# Patient Record
Sex: FEMALE | Race: WHITE | NOT HISPANIC OR LATINO
[De-identification: names, ages, dates, MRNs, and addresses within clinical notes are randomized per-mention and may not be internally consistent; named-entity substitution may affect disease eponyms.]

---

## 2020-04-26 ENCOUNTER — MESSAGE (OUTPATIENT)
Age: 45
End: 2020-04-26

## 2020-06-21 ENCOUNTER — TRANSCRIPTION ENCOUNTER (OUTPATIENT)
Age: 45
End: 2020-06-21

## 2020-09-20 ENCOUNTER — TRANSCRIPTION ENCOUNTER (OUTPATIENT)
Age: 45
End: 2020-09-20

## 2020-12-09 PROBLEM — Z00.00 ENCOUNTER FOR PREVENTIVE HEALTH EXAMINATION: Status: ACTIVE | Noted: 2020-12-09

## 2021-01-08 ENCOUNTER — LABORATORY RESULT (OUTPATIENT)
Age: 46
End: 2021-01-08

## 2021-01-08 ENCOUNTER — APPOINTMENT (OUTPATIENT)
Dept: RHEUMATOLOGY | Facility: CLINIC | Age: 46
End: 2021-01-08
Payer: COMMERCIAL

## 2021-01-08 VITALS
DIASTOLIC BLOOD PRESSURE: 87 MMHG | BODY MASS INDEX: 34.99 KG/M2 | OXYGEN SATURATION: 97 % | HEART RATE: 84 BPM | HEIGHT: 65 IN | WEIGHT: 210 LBS | SYSTOLIC BLOOD PRESSURE: 124 MMHG

## 2021-01-08 DIAGNOSIS — Z78.9 OTHER SPECIFIED HEALTH STATUS: ICD-10-CM

## 2021-01-08 DIAGNOSIS — F41.9 ANXIETY DISORDER, UNSPECIFIED: ICD-10-CM

## 2021-01-08 PROCEDURE — 99245 OFF/OP CONSLTJ NEW/EST HI 55: CPT

## 2021-01-08 PROCEDURE — 99072 ADDL SUPL MATRL&STAF TM PHE: CPT

## 2021-01-11 ENCOUNTER — LABORATORY RESULT (OUTPATIENT)
Age: 46
End: 2021-01-11

## 2021-01-12 PROBLEM — Z78.9 NON-SMOKER: Status: ACTIVE | Noted: 2021-01-12

## 2021-01-12 PROBLEM — F41.9 ANXIETY: Status: ACTIVE | Noted: 2021-01-12

## 2021-01-12 NOTE — HISTORY OF PRESENT ILLNESS
[FreeTextEntry1] : 46 yo PMHX psoriasis, graves, htn, anxiety here for evaluation of polyarthralgias\par \par Joint pain - slow onset.  intermittent and random\par - toes hurt.  worsened with movement \par - hands swelling - entire hand - cant get wedding band on - \par - no one finger - entire hand \par \par Associated symptoms and rheum ROS\par - Developed paraesthesias hand and was found to have bilateral carpal tunnel and cubital tunnel syndromes.  Now s/p surgery for the same.  These were successful with resolution of paraesthesias.  \par - legs cramps no weakness\par - back hurts - sore- though not particularly worse in the AM\par - Psoriasis - since high school.   started on otezla (effective but gi issues), cosentyx (ineffective), now on skyrizi ().  also using shampoos.   skin was so bad that hair was falling out in patches.   is supposed to start rogaine soon.  waiting for more response from the skyrizi first.  \par - eyes look red - no pain \par - preeclampsia with first baby\par \par Gen ROS\par - covid - 2020 -that started since covid.  anosmia for three weeks.   mild congestion.    had it but kids did not get it\par \par \par PMHX \par graves  s/p radioactive iodine - now on synthroid.  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 - \par GERD\par \par Meds\par Synthroid\par lexapro\par omeprazole \par vitamin d\par losartan\par  [Anorexia] : no anorexia [Weight Loss] : no weight loss [Malaise] : no malaise [Fever] : no fever [Depression] : no depression [Skin Lesions] : skin lesions [Oral Ulcers] : no oral ulcers [Cough] : no cough [Dry Mouth] : no dry mouth [Dysphonia] : no dysphonia [Dysphagia] : no dysphagia [Shortness of Breath] : no shortness of breath [Joint Swelling] : joint swelling [Joint Warmth] : no joint warmth [Joint Deformity] : no joint deformity [Decreased ROM] : no decreased range of motion [Morning Stiffness] : no morning stiffness [Difficulty Standing] : no difficulty standing [Myalgias] : no myalgias [Visual Changes] : no visual changes [Eye Redness] : eye redness [Dry Eyes] : no dry eyes

## 2021-01-12 NOTE — PHYSICAL EXAM
[General Appearance - Alert] : alert [General Appearance - In No Acute Distress] : in no acute distress [General Appearance - Well Nourished] : well nourished [General Appearance - Well Developed] : well developed [General Appearance - Well-Appearing] : healthy appearing [Sclera] : the sclera and conjunctiva were normal [PERRL With Normal Accommodation] : pupils were equal in size, round, and reactive to light [Extraocular Movements] : extraocular movements were intact [Outer Ear] : the ears and nose were normal in appearance [Oropharynx] : the oropharynx was normal [Neck Appearance] : the appearance of the neck was normal [Neck Cervical Mass (___cm)] : no neck mass was observed [Jugular Venous Distention Increased] : there was no jugular-venous distention [Thyroid Diffuse Enlargement] : the thyroid was not enlarged [Thyroid Nodule] : there were no palpable thyroid nodules [Auscultation Breath Sounds / Voice Sounds] : lungs were clear to auscultation bilaterally [Heart Rate And Rhythm] : heart rate was normal and rhythm regular [Heart Sounds] : normal S1 and S2 [Heart Sounds Gallop] : no gallops [Murmurs] : no murmurs [Heart Sounds Pericardial Friction Rub] : no pericardial rub [Full Pulse] : the pedal pulses are present [Edema] : there was no peripheral edema [Cervical Lymph Nodes Enlarged Posterior Bilaterally] : posterior cervical [Cervical Lymph Nodes Enlarged Anterior Bilaterally] : anterior cervical [Supraclavicular Lymph Nodes Enlarged Bilaterally] : supraclavicular [No CVA Tenderness] : no ~M costovertebral angle tenderness [No Spinal Tenderness] : no spinal tenderness [Abnormal Walk] : normal gait [Nail Clubbing] : no clubbing  or cyanosis of the fingernails [Musculoskeletal - Swelling] : no joint swelling seen [Motor Tone] : muscle strength and tone were normal [Skin Color & Pigmentation] : normal skin color and pigmentation [Skin Turgor] : normal skin turgor [] : no rash [FreeTextEntry1] : psoriais [Deep Tendon Reflexes (DTR)] : deep tendon reflexes were 2+ and symmetric [Sensation] : the sensory exam was normal to light touch and pinprick [No Focal Deficits] : no focal deficits [Oriented To Time, Place, And Person] : oriented to person, place, and time [Impaired Insight] : insight and judgment were intact [Affect] : the affect was normal

## 2021-01-12 NOTE — ASSESSMENT
[FreeTextEntry1] : 44 yo woman PMHX psoriais, htn, graves, gerd, preeclampsia here with polyarthralgias\par \par #polyarthralgias - though doesn’t appear inflammatory on exam given psoriasis and autoimmunity (graves) and entrapment syndromes recommend screening with serologies and subseroogies\par - currently on skyrizi so ? muting the inflammatory markers we usually look at \par - retrieve xrays and MRI from ortho\par \par #severe psoriasis\par - on skyrizi\par - f/u derm\par \par #covid\par - check ab response\par \par #h/o bells\par =check lyme

## 2021-01-12 NOTE — CONSULT LETTER
[Dear  ___] : Dear  [unfilled], [Consult Letter:] : I had the pleasure of evaluating your patient, [unfilled]. [Please see my note below.] : Please see my note below. [Consult Closing:] : Thank you very much for allowing me to participate in the care of this patient.  If you have any questions, please do not hesitate to contact me. [Sincerely,] : Sincerely, [FreeTextEntry2] : Raymundo Vargas

## 2021-01-13 ENCOUNTER — NON-APPOINTMENT (OUTPATIENT)
Age: 46
End: 2021-01-13

## 2021-01-13 LAB
25(OH)D3 SERPL-MCNC: 26.6 NG/ML
ALBUMIN SERPL ELPH-MCNC: 4.4 G/DL
ALP BLD-CCNC: 52 U/L
ALT SERPL-CCNC: 15 U/L
ANION GAP SERPL CALC-SCNC: 18 MMOL/L
AST SERPL-CCNC: 16 U/L
B BURGDOR IGG+IGM SER QL IB: NORMAL
BILIRUB SERPL-MCNC: 0.4 MG/DL
BUN SERPL-MCNC: 12 MG/DL
CALCIUM SERPL-MCNC: 9 MG/DL
CCP AB SER IA-ACNC: 21 UNITS
CHLORIDE SERPL-SCNC: 104 MMOL/L
CO2 SERPL-SCNC: 17 MMOL/L
CREAT SERPL-MCNC: 0.88 MG/DL
CRP SERPL-MCNC: 0.48 MG/DL
POTASSIUM SERPL-SCNC: 4.1 MMOL/L
PROT SERPL-MCNC: 6.8 G/DL
RF+CCP IGG SER-IMP: ABNORMAL
RHEUMATOID FACT SER QL: <10 IU/ML
SARS-COV-2 IGG SERPL IA-ACNC: 6.26 INDEX
SARS-COV-2 IGG SERPL QL IA: POSITIVE
SODIUM SERPL-SCNC: 138 MMOL/L
TSH SERPL-ACNC: 37.9 UIU/ML

## 2021-01-14 ENCOUNTER — NON-APPOINTMENT (OUTPATIENT)
Age: 46
End: 2021-01-14

## 2021-01-14 LAB — ANA SER IF-ACNC: NEGATIVE

## 2021-02-12 ENCOUNTER — TRANSCRIPTION ENCOUNTER (OUTPATIENT)
Age: 46
End: 2021-02-12

## 2021-02-16 ENCOUNTER — APPOINTMENT (OUTPATIENT)
Dept: RHEUMATOLOGY | Facility: CLINIC | Age: 46
End: 2021-02-16
Payer: COMMERCIAL

## 2021-02-16 PROCEDURE — 99214 OFFICE O/P EST MOD 30 MIN: CPT | Mod: 95

## 2021-02-16 NOTE — PHYSICAL EXAM
[General Appearance - Alert] : alert [General Appearance - In No Acute Distress] : in no acute distress [] : no respiratory distress [Oriented To Time, Place, And Person] : oriented to person, place, and time [Impaired Insight] : insight and judgment were intact [Affect] : the affect was normal [FreeTextEntry1] : no synovitis apparent - though diffusely puffy

## 2021-02-16 NOTE — HISTORY OF PRESENT ILLNESS
[Home] : at home, [unfilled] , at the time of the visit. [Other Location: e.g. Home (Enter Location, City,State)___] : at [unfilled] [Verbal consent obtained from patient] : the patient, [unfilled] [Skin Lesions] : skin lesions [Joint Swelling] : joint swelling [Eye Redness] : eye redness [FreeTextEntry1] : INTERVAL HX\par - TSH was markedly elevated - feel fine and is having it re-run - following up with primary\par - persistent pain in the hands and back- swollen\par - not taking any meds\par - skyrizi is helping for the skin though [Anorexia] : no anorexia [Weight Loss] : no weight loss [Malaise] : no malaise [Fever] : no fever [Depression] : no depression [Oral Ulcers] : no oral ulcers [Cough] : no cough [Dry Mouth] : no dry mouth [Dysphonia] : no dysphonia [Dysphagia] : no dysphagia [Shortness of Breath] : no shortness of breath [Joint Warmth] : no joint warmth [Joint Deformity] : no joint deformity [Decreased ROM] : no decreased range of motion [Morning Stiffness] : no morning stiffness [Difficulty Standing] : no difficulty standing [Myalgias] : no myalgias [Visual Changes] : no visual changes [Dry Eyes] : no dry eyes

## 2021-02-16 NOTE — CONSULT LETTER
[Dear  ___] : Dear  [unfilled], [Courtesy Letter:] : I had the pleasure of seeing your patient, [unfilled], in my office today. [Please see my note below.] : Please see my note below. [Consult Closing:] : Thank you very much for allowing me to participate in the care of this patient.  If you have any questions, please do not hesitate to contact me. [Sincerely,] : Sincerely, [FreeTextEntry2] : Raymundo Vargas

## 2021-02-16 NOTE — ASSESSMENT
[FreeTextEntry1] : 44 yo woman PMHX psoriais, htn, graves, gerd, preeclampsia here with polyarthralgias\par \par - thursday march 18 - teb 730 AM\par \par #polyarthralgias -HLAB27 neg \par -  though doesn’t appear inflammatory on exam given psoriasis and autoimmunity (graves) and entrapment syndromes recommend screening with serologies and subseroogies\par - weak psoitve ccp and subsequently negative on confirmation  and neg rf- doubt ra\par - mild increase in crp\par - currently on skyrizi so ? muting the inflammatory markers we usually look at \par - retrieve xrays and MRI from ortho\par - rec relafen bid r/b/a discussed\par - d/w patoeint consideration to swithc to tnf inh should nsaid not relieve pain\par \par #TSH elevated - could contribute to joint symptoms\par - repeat this with t4\par -explained to patient how abnormal thyroid funciton can contribute to msk pain\par \par #severe psoriasis - was on cosentyx or otezla\par - on skyrizi\par - f/u derm\par \par #low vit d\par - supplement increased  and will \par followup\par \par #medication monitoring \par - now on NSAID and +h/o gerd take with food and  on omeprazole\par \par #covid\par - with low positive ab\par \par #h/o bells\par =check lyme

## 2021-03-15 ENCOUNTER — TRANSCRIPTION ENCOUNTER (OUTPATIENT)
Age: 46
End: 2021-03-15

## 2021-03-17 ENCOUNTER — TRANSCRIPTION ENCOUNTER (OUTPATIENT)
Age: 46
End: 2021-03-17

## 2021-03-18 ENCOUNTER — APPOINTMENT (OUTPATIENT)
Dept: RHEUMATOLOGY | Facility: CLINIC | Age: 46
End: 2021-03-18
Payer: COMMERCIAL

## 2021-03-18 PROCEDURE — 99214 OFFICE O/P EST MOD 30 MIN: CPT | Mod: 95

## 2021-03-18 RX ORDER — NABUMETONE 500 MG/1
500 TABLET ORAL
Qty: 60 | Refills: 2 | Status: DISCONTINUED | COMMUNITY
Start: 2021-02-16 | End: 2021-03-18

## 2021-03-18 NOTE — PHYSICAL EXAM
[General Appearance - Alert] : alert [General Appearance - In No Acute Distress] : in no acute distress [] : no respiratory distress [FreeTextEntry1] : no synovitis apparent - though diffusely puffy [Oriented To Time, Place, And Person] : oriented to person, place, and time [Impaired Insight] : insight and judgment were intact [Affect] : the affect was normal

## 2021-03-18 NOTE — HISTORY OF PRESENT ILLNESS
[FreeTextEntry1] : \par INTERVAL HX\par - doing well - overall - feels okaoy and the same \par - no worse\par - no different in the joint pains - certain days feels tight\par - throughout the day better\par - kneeling and squatting \par - has been taking the relafen - makes it feel dizzi -\par - skyrizi hleps with skin - 90% improvement [Home] : at home, [unfilled] , at the time of the visit. [Other Location: e.g. Home (Enter Location, City,State)___] : at [unfilled] [Verbal consent obtained from patient] : the patient, [unfilled] [Anorexia] : no anorexia [Weight Loss] : no weight loss [Malaise] : no malaise [Fever] : no fever [Depression] : no depression [Skin Lesions] : skin lesions [Oral Ulcers] : no oral ulcers [Cough] : no cough [Dry Mouth] : no dry mouth [Dysphonia] : no dysphonia [Dysphagia] : no dysphagia [Shortness of Breath] : no shortness of breath [Joint Swelling] : joint swelling [Joint Warmth] : no joint warmth [Joint Deformity] : no joint deformity [Decreased ROM] : no decreased range of motion [Morning Stiffness] : no morning stiffness [Difficulty Standing] : no difficulty standing [Myalgias] : no myalgias [Visual Changes] : no visual changes [Eye Redness] : eye redness [Dry Eyes] : no dry eyes

## 2021-03-18 NOTE — ASSESSMENT
[FreeTextEntry1] : 46 yo woman PMHX psoriais, htn, graves, gerd, preeclampsia here with polyarthralgias\par \par #polyarthralgias -HLAB27 neg \par -  though doesn’t appear inflammatory on exam given psoriasis and autoimmunity (graves) and entrapment syndromes however on skyrizi so this may mute the inflammatory markers we usually look at \par - retrieve xrays and MRI from ortho - still havent received - patient to facilitate\par - relafen not helpful - causing dizz.  stop and start meloxicam r/b/a discussed\par - d/w patoeint consideration to swithc to tnf inh should nsaid not relieve pain - patient to d/w dermatology this week\par - paitent will contact me early next week to discuss her decision and steps to move forward\par \par #TSH elevated - could contribute to joint symptoms however restoration of thyroid function didn’t improve the pain \par - f/u endo and continue meds\par \par #severe psoriasis - was on cosentyx/otezla - currently on skyrizi.  never on tnf inh\par - f/u derm\par \par #low vit d\par - supplement increased  and will \par followup\par \par #medication monitoring \par - now on NSAID and +h/o gerd take with food and  on omeprazole\par - dizzi after Relafen - will dc and switch\par

## 2021-03-25 ENCOUNTER — TRANSCRIPTION ENCOUNTER (OUTPATIENT)
Age: 46
End: 2021-03-25

## 2021-03-26 ENCOUNTER — TRANSCRIPTION ENCOUNTER (OUTPATIENT)
Age: 46
End: 2021-03-26

## 2021-04-07 ENCOUNTER — TRANSCRIPTION ENCOUNTER (OUTPATIENT)
Age: 46
End: 2021-04-07

## 2021-04-07 LAB
ALBUMIN SERPL ELPH-MCNC: 4.7 G/DL
ALP BLD-CCNC: 62 U/L
ALT SERPL-CCNC: 16 U/L
ANION GAP SERPL CALC-SCNC: 15 MMOL/L
AST SERPL-CCNC: 13 U/L
BASOPHILS # BLD AUTO: 0.04 K/UL
BASOPHILS NFR BLD AUTO: 0.3 %
BILIRUB SERPL-MCNC: 0.2 MG/DL
BUN SERPL-MCNC: 13 MG/DL
CALCIUM SERPL-MCNC: 9.7 MG/DL
CHLORIDE SERPL-SCNC: 105 MMOL/L
CO2 SERPL-SCNC: 22 MMOL/L
CREAT SERPL-MCNC: 0.68 MG/DL
CRP SERPL-MCNC: <3 MG/L
EOSINOPHIL # BLD AUTO: 0.04 K/UL
EOSINOPHIL NFR BLD AUTO: 0.3 %
ERYTHROCYTE [SEDIMENTATION RATE] IN BLOOD BY WESTERGREN METHOD: 8 MM/HR
HAV IGM SER QL: NONREACTIVE
HBV CORE IGM SER QL: NONREACTIVE
HBV SURFACE AG SER QL: NONREACTIVE
HCT VFR BLD CALC: 43.3 %
HCV AB SER QL: NONREACTIVE
HCV S/CO RATIO: 0.27 S/CO
HGB BLD-MCNC: 13.7 G/DL
IMM GRANULOCYTES NFR BLD AUTO: 0.4 %
LYMPHOCYTES # BLD AUTO: 1.29 K/UL
LYMPHOCYTES NFR BLD AUTO: 8.5 %
MAN DIFF?: NORMAL
MCHC RBC-ENTMCNC: 28.8 PG
MCHC RBC-ENTMCNC: 31.6 GM/DL
MCV RBC AUTO: 91 FL
MONOCYTES # BLD AUTO: 0.74 K/UL
MONOCYTES NFR BLD AUTO: 4.9 %
NEUTROPHILS # BLD AUTO: 13.02 K/UL
NEUTROPHILS NFR BLD AUTO: 85.6 %
PLATELET # BLD AUTO: 416 K/UL
POTASSIUM SERPL-SCNC: 4.6 MMOL/L
PROT SERPL-MCNC: 7 G/DL
RBC # BLD: 4.76 M/UL
RBC # FLD: 12.9 %
SODIUM SERPL-SCNC: 142 MMOL/L
WBC # FLD AUTO: 15.19 K/UL

## 2021-04-08 LAB
M TB IFN-G BLD-IMP: NEGATIVE
QUANTIFERON TB PLUS MITOGEN MINUS NIL: 7.5 IU/ML
QUANTIFERON TB PLUS NIL: 0.01 IU/ML
QUANTIFERON TB PLUS TB1 MINUS NIL: 0 IU/ML
QUANTIFERON TB PLUS TB2 MINUS NIL: 0 IU/ML

## 2021-04-13 ENCOUNTER — TRANSCRIPTION ENCOUNTER (OUTPATIENT)
Age: 46
End: 2021-04-13

## 2021-05-18 ENCOUNTER — TRANSCRIPTION ENCOUNTER (OUTPATIENT)
Age: 46
End: 2021-05-18

## 2021-06-03 ENCOUNTER — TRANSCRIPTION ENCOUNTER (OUTPATIENT)
Age: 46
End: 2021-06-03

## 2021-06-09 ENCOUNTER — TRANSCRIPTION ENCOUNTER (OUTPATIENT)
Age: 46
End: 2021-06-09

## 2021-06-16 ENCOUNTER — TRANSCRIPTION ENCOUNTER (OUTPATIENT)
Age: 46
End: 2021-06-16

## 2021-06-18 ENCOUNTER — APPOINTMENT (OUTPATIENT)
Dept: RHEUMATOLOGY | Facility: CLINIC | Age: 46
End: 2021-06-18
Payer: COMMERCIAL

## 2021-06-18 ENCOUNTER — LABORATORY RESULT (OUTPATIENT)
Age: 46
End: 2021-06-18

## 2021-06-18 ENCOUNTER — RESULT REVIEW (OUTPATIENT)
Age: 46
End: 2021-06-18

## 2021-06-18 VITALS
HEART RATE: 80 BPM | BODY MASS INDEX: 34.99 KG/M2 | OXYGEN SATURATION: 99 % | DIASTOLIC BLOOD PRESSURE: 84 MMHG | HEIGHT: 65 IN | SYSTOLIC BLOOD PRESSURE: 123 MMHG | WEIGHT: 210 LBS

## 2021-06-18 PROCEDURE — 99215 OFFICE O/P EST HI 40 MIN: CPT

## 2021-06-18 PROCEDURE — 99072 ADDL SUPL MATRL&STAF TM PHE: CPT

## 2021-06-18 NOTE — ASSESSMENT
[FreeTextEntry1] : 44 yo woman PMHX psoriais, htn, graves, gerd, preeclampsia here with polyarthralgia\par \par # PsA.  HLAB27 neg \par - skyrizi ([id number]) - effective only for skin, otezla (diarrhea), ? cosentyx vs stelara in the past\par - started humira (2021 - present).  very effective with reduction in pain and swelling of the otes and hands.   now wihtout pain in the toes and the past this was source of great concern.   \par - doubt current back issues are SpA - but will check xrays for now\par - doubt current symptoms related to humira - but will hold drug while doing workup and assess if improvement without taking it\par \par #Back pain subacute associated with heat intolerance, cramping, parasthesias (random and not in distribution), forgetfulness and poor sleep.   \par - doubt related to humira though temporally linked to starting the drug \par - check inflammatory labs - check crystal/lipase \par - rec neuro evla given the forgetfullness, paraesthesias and cramping - doubt ms though it has been reported with tnf inh \par \par #TSH elevated - could contribute to joint symptoms - now with heat intolerance \par - check TSH and T4\par \par #severe psoriasis - previously on otezla (severe diarrhea), ? cosentyx, skyrizi (great for skin)\par - f/u derm\par \par #low vit d\par - supplement increased  and will \par followup\par \par #medication monitoring \par - now on NSAID and +h/o gerd take with food and  on omeprazole\par - will check ab to drug \par \par #covid\par - with low positive ab\par \par #h/o bells\par -check lyme

## 2021-06-18 NOTE — PHYSICAL EXAM
[General Appearance - Alert] : alert [General Appearance - In No Acute Distress] : in no acute distress [Oriented To Time, Place, And Person] : oriented to person, place, and time [Impaired Insight] : insight and judgment were intact [Affect] : the affect was normal [General Appearance - Well Nourished] : well nourished [General Appearance - Well Developed] : well developed [General Appearance - Well-Appearing] : healthy appearing [Sclera] : the sclera and conjunctiva were normal [Auscultation Breath Sounds / Voice Sounds] : lungs were clear to auscultation bilaterally [Heart Rate And Rhythm] : heart rate was normal and rhythm regular [Heart Sounds] : normal S1 and S2 [Heart Sounds Gallop] : no gallops [Murmurs] : no murmurs [Heart Sounds Pericardial Friction Rub] : no pericardial rub [Edema] : there was no peripheral edema [Veins - Varicosity Changes] : there were no varicosital changes [No CVA Tenderness] : no ~M costovertebral angle tenderness [No Spinal Tenderness] : no spinal tenderness [Abnormal Walk] : normal gait [Nail Clubbing] : no clubbing  or cyanosis of the fingernails [Musculoskeletal - Swelling] : no joint swelling seen [Motor Tone] : muscle strength and tone were normal [Skin Color & Pigmentation] : normal skin color and pigmentation [Skin Turgor] : normal skin turgor [] : no rash [Motor Exam] : the motor exam was normal [No Focal Deficits] : no focal deficits [FreeTextEntry1] : able to ambulate, stand on toes and heels, MS 5/5

## 2021-06-18 NOTE — HISTORY OF PRESENT ILLNESS
[Skin Lesions] : skin lesions [Joint Swelling] : joint swelling [Eye Redness] : eye redness [FreeTextEntry1] : INTERVAL HX\par - started the humira has had about 7 shots already.   in terms of the psoriasis is doing quite well with this.   skin is controlled. in terms of the foot pain and swelling which before was the primary source of pain and dysfucnitoino - this has completely resolved.   in terms of the hands no pain or swelling \par - is concerned and worried about some new - about 3 weeks - symptoms.  ill-defined back pain.  not in one area.  has parasthesaisis left < right upper and lower.  doesn’t feel right inside. \par -  feels like soemthign is not right - \par - back feels constantly throbbing\par - the whole back - doesn’t feel like muscles \par - ill-defined - no trauma\par - cant get comfortable, stand or sleep \par - slowly getting worse\par - bends over - because cant hold \par - no abdomial pain - no cp or sob \par - the other joint magda - the toes feel better and can now bend toes without pain and that was the biggest issue before \par - even the hands are very sensitiv e- cant open a job \par - has touse a handle - cant  \par - is nervous because of weight ebv, smoking - feet cramps -occasionally dizzi - clumsy  - forgetfullness (eg to pick child up from school)\par - left lside o f leg is getting pins and needles - just lays \par - poor unrestored sleep just startd \par - heat intolerance, sweating - hasn’t had thyroid checkd like in the past -  [Anorexia] : no anorexia [Weight Loss] : no weight loss [Malaise] : no malaise [Fever] : no fever [Depression] : no depression [Oral Ulcers] : no oral ulcers [Cough] : no cough [Dry Mouth] : no dry mouth [Dysphonia] : no dysphonia [Dysphagia] : no dysphagia [Shortness of Breath] : no shortness of breath [Joint Warmth] : no joint warmth [Joint Deformity] : no joint deformity [Decreased ROM] : no decreased range of motion [Morning Stiffness] : no morning stiffness [Difficulty Standing] : no difficulty standing [Myalgias] : no myalgias [Visual Changes] : no visual changes [Dry Eyes] : no dry eyes

## 2021-06-20 LAB
ALBUMIN SERPL ELPH-MCNC: 4.6 G/DL
ALP BLD-CCNC: 58 U/L
ALT SERPL-CCNC: 21 U/L
AMYLASE/CREAT SERPL: 54 U/L
ANION GAP SERPL CALC-SCNC: 13 MMOL/L
AST SERPL-CCNC: 16 U/L
BASOPHILS # BLD AUTO: 0.06 K/UL
BASOPHILS NFR BLD AUTO: 0.7 %
BILIRUB SERPL-MCNC: 0.4 MG/DL
BUN SERPL-MCNC: 12 MG/DL
CALCIUM SERPL-MCNC: 9.4 MG/DL
CHLORIDE SERPL-SCNC: 101 MMOL/L
CO2 SERPL-SCNC: 24 MMOL/L
CREAT SERPL-MCNC: 0.79 MG/DL
CRP SERPL-MCNC: 3 MG/L
DSDNA AB SER-ACNC: <12 IU/ML
EOSINOPHIL # BLD AUTO: 0.48 K/UL
EOSINOPHIL NFR BLD AUTO: 5.9 %
ERYTHROCYTE [SEDIMENTATION RATE] IN BLOOD BY WESTERGREN METHOD: 10 MM/HR
HCT VFR BLD CALC: 44.9 %
HGB BLD-MCNC: 14.2 G/DL
IMM GRANULOCYTES NFR BLD AUTO: 0.4 %
LPL SERPL-CCNC: 36 U/L
LYMPHOCYTES # BLD AUTO: 1.7 K/UL
LYMPHOCYTES NFR BLD AUTO: 21.1 %
MAN DIFF?: NORMAL
MCHC RBC-ENTMCNC: 29.2 PG
MCHC RBC-ENTMCNC: 31.6 GM/DL
MCV RBC AUTO: 92.4 FL
MONOCYTES # BLD AUTO: 0.52 K/UL
MONOCYTES NFR BLD AUTO: 6.4 %
NEUTROPHILS # BLD AUTO: 5.28 K/UL
NEUTROPHILS NFR BLD AUTO: 65.5 %
PLATELET # BLD AUTO: 369 K/UL
POTASSIUM SERPL-SCNC: 4.1 MMOL/L
PROT SERPL-MCNC: 6.8 G/DL
RBC # BLD: 4.86 M/UL
RBC # FLD: 13.7 %
SODIUM SERPL-SCNC: 138 MMOL/L
TSH SERPL-ACNC: 22.2 UIU/ML
WBC # FLD AUTO: 8.07 K/UL

## 2021-06-21 LAB — ANA SER IF-ACNC: NEGATIVE

## 2021-06-23 ENCOUNTER — TRANSCRIPTION ENCOUNTER (OUTPATIENT)
Age: 46
End: 2021-06-23

## 2021-06-23 LAB — HISTONE AB SER QL: 0.9 UNITS

## 2021-06-28 ENCOUNTER — APPOINTMENT (OUTPATIENT)
Dept: RADIOLOGY | Facility: CLINIC | Age: 46
End: 2021-06-28
Payer: COMMERCIAL

## 2021-06-28 ENCOUNTER — OUTPATIENT (OUTPATIENT)
Dept: OUTPATIENT SERVICES | Facility: HOSPITAL | Age: 46
LOS: 1 days | End: 2021-06-28
Payer: COMMERCIAL

## 2021-06-28 DIAGNOSIS — L40.50 ARTHROPATHIC PSORIASIS, UNSPECIFIED: ICD-10-CM

## 2021-06-28 PROCEDURE — 72070 X-RAY EXAM THORAC SPINE 2VWS: CPT | Mod: 26

## 2021-06-28 PROCEDURE — 72100 X-RAY EXAM L-S SPINE 2/3 VWS: CPT | Mod: 26

## 2021-06-28 PROCEDURE — 72040 X-RAY EXAM NECK SPINE 2-3 VW: CPT | Mod: 26

## 2021-06-28 PROCEDURE — 72070 X-RAY EXAM THORAC SPINE 2VWS: CPT

## 2021-06-28 PROCEDURE — 72100 X-RAY EXAM L-S SPINE 2/3 VWS: CPT

## 2021-06-28 PROCEDURE — 72040 X-RAY EXAM NECK SPINE 2-3 VW: CPT

## 2021-06-29 ENCOUNTER — APPOINTMENT (OUTPATIENT)
Dept: RHEUMATOLOGY | Facility: CLINIC | Age: 46
End: 2021-06-29

## 2021-06-30 ENCOUNTER — TRANSCRIPTION ENCOUNTER (OUTPATIENT)
Age: 46
End: 2021-06-30

## 2021-06-30 LAB
ADALIMUMAB AB SERPL-MCNC: 62 NG/ML
ADALIMUMAB SERPL-MCNC: 5.5 UG/ML

## 2021-07-06 ENCOUNTER — TRANSCRIPTION ENCOUNTER (OUTPATIENT)
Age: 46
End: 2021-07-06

## 2021-07-08 ENCOUNTER — TRANSCRIPTION ENCOUNTER (OUTPATIENT)
Age: 46
End: 2021-07-08

## 2021-07-20 ENCOUNTER — APPOINTMENT (OUTPATIENT)
Dept: RHEUMATOLOGY | Facility: CLINIC | Age: 46
End: 2021-07-20
Payer: COMMERCIAL

## 2021-07-20 ENCOUNTER — LABORATORY RESULT (OUTPATIENT)
Age: 46
End: 2021-07-20

## 2021-07-20 VITALS
SYSTOLIC BLOOD PRESSURE: 134 MMHG | HEART RATE: 84 BPM | BODY MASS INDEX: 35.32 KG/M2 | OXYGEN SATURATION: 98 % | HEIGHT: 65 IN | DIASTOLIC BLOOD PRESSURE: 88 MMHG | WEIGHT: 212 LBS

## 2021-07-20 PROCEDURE — 99214 OFFICE O/P EST MOD 30 MIN: CPT | Mod: 25

## 2021-07-20 PROCEDURE — 99072 ADDL SUPL MATRL&STAF TM PHE: CPT

## 2021-07-20 PROCEDURE — 36415 COLL VENOUS BLD VENIPUNCTURE: CPT

## 2021-07-20 NOTE — ASSESSMENT
[FreeTextEntry1] : 44 yo woman PMHX psoriais, htn, graves, gerd, preeclampsia here with polyarthralgia\par \par # PsA.  HLAB27 neg \par - skyrizi ([id number]) - effective only for skin, otezla (diarrhea), ? cosentyx vs stelara in the past\par - started humira (2021 - present).  very effective with reduction in pain and swelling of the otes and hands.   now wihtout pain in the toes and the past this was source of great concern.   \par - doubt current back issues are SpA - but will check xrays for now\par - doubt current symptoms related to humira - but will hold drug while doing workup and assess if improvement without taking it\par \par #Back pain subacute associated with heat intolerance, cramping, parasthesias (random and not in distribution), forgetfulness and poor sleep.   \par - now in neuro care \par - continue malena and lido\par - mri pending\par \par #TSH elevated - could contribute to joint symptoms - now with heat intolerance \par - was markedly elevated last visit though T4 normal - will recheck today\par - check TSH and T4\par \par #severe psoriasis - previously on otezla (severe diarrhea), ? cosentyx, skyrizi (great for skin)\par - f/u derm\par \par #low vit d\par - supplement increased  and will \par followup\par \par #medication monitoring \par - quant tb negative april 2021\par - now on NSAID and +h/o gerd take with food and  on omeprazole\par - will check ab to drug \par \par #covid\par - with low positive ab

## 2021-07-20 NOTE — PHYSICAL EXAM
[General Appearance - Alert] : alert [General Appearance - In No Acute Distress] : in no acute distress [General Appearance - Well Nourished] : well nourished [General Appearance - Well Developed] : well developed [General Appearance - Well-Appearing] : healthy appearing [Sclera] : the sclera and conjunctiva were normal [Auscultation Breath Sounds / Voice Sounds] : lungs were clear to auscultation bilaterally [Heart Rate And Rhythm] : heart rate was normal and rhythm regular [Heart Sounds] : normal S1 and S2 [Heart Sounds Gallop] : no gallops [Murmurs] : no murmurs [Heart Sounds Pericardial Friction Rub] : no pericardial rub [Edema] : there was no peripheral edema [Veins - Varicosity Changes] : there were no varicosital changes [No CVA Tenderness] : no ~M costovertebral angle tenderness [No Spinal Tenderness] : no spinal tenderness [Abnormal Walk] : normal gait [Nail Clubbing] : no clubbing  or cyanosis of the fingernails [Musculoskeletal - Swelling] : no joint swelling seen [Motor Tone] : muscle strength and tone were normal [Skin Color & Pigmentation] : normal skin color and pigmentation [Skin Turgor] : normal skin turgor [] : no rash [Motor Exam] : the motor exam was normal [No Focal Deficits] : no focal deficits [Oriented To Time, Place, And Person] : oriented to person, place, and time [Impaired Insight] : insight and judgment were intact [Affect] : the affect was normal [FreeTextEntry1] : able to ambulate, stand on toes and heels, MS 5/5

## 2021-07-20 NOTE — HISTORY OF PRESENT ILLNESS
[Skin Lesions] : skin lesions [Joint Swelling] : joint swelling [Eye Redness] : eye redness [FreeTextEntry1] : INTERVAL HX\par - noticed worsening skin and joints when off the humira.  now back on it and much better. still cant wear primary rings - still has more than an hour of stiffness.  but going to take a booster and feels like this will help \par - the back pain seems better with gabapentin and lido patches (though didn’t start these yet) - seeing neurology.  had emg last weekend and scheduled for MRI.  neuro cleared restart of  the humira. \par - is concerned and worried about some new - about 3 weeks - symptoms.  ill-defined back pain.  not in one area.  has parasthesaisis left < right upper and lower.  doesn’t feel right inside. \par -  feels like soemthign is not right - \par - back feels constantly throbbing\par - the whole back - doesn’t feel like muscles \par - ill-defined - no trauma\par - no abdomial pain - no cp or sob  [Anorexia] : no anorexia [Weight Loss] : no weight loss [Malaise] : no malaise [Fever] : no fever [Depression] : no depression [Oral Ulcers] : no oral ulcers [Cough] : no cough [Dry Mouth] : no dry mouth [Dysphonia] : no dysphonia [Dysphagia] : no dysphagia [Shortness of Breath] : no shortness of breath [Joint Warmth] : no joint warmth [Joint Deformity] : no joint deformity [Decreased ROM] : no decreased range of motion [Morning Stiffness] : no morning stiffness [Difficulty Standing] : no difficulty standing [Myalgias] : no myalgias [Visual Changes] : no visual changes [Dry Eyes] : no dry eyes

## 2021-07-23 ENCOUNTER — NON-APPOINTMENT (OUTPATIENT)
Age: 46
End: 2021-07-23

## 2021-07-23 LAB
ALBUMIN SERPL ELPH-MCNC: 4.4 G/DL
ALP BLD-CCNC: 62 U/L
ALT SERPL-CCNC: 15 U/L
ANION GAP SERPL CALC-SCNC: 14 MMOL/L
AST SERPL-CCNC: 17 U/L
BASOPHILS # BLD AUTO: 0.06 K/UL
BASOPHILS NFR BLD AUTO: 0.9 %
BILIRUB SERPL-MCNC: 0.2 MG/DL
BUN SERPL-MCNC: 12 MG/DL
CALCIUM SERPL-MCNC: 9 MG/DL
CHLORIDE SERPL-SCNC: 106 MMOL/L
CO2 SERPL-SCNC: 19 MMOL/L
CREAT SERPL-MCNC: 0.7 MG/DL
CRP SERPL-MCNC: 3 MG/L
EOSINOPHIL # BLD AUTO: 0.41 K/UL
EOSINOPHIL NFR BLD AUTO: 6.2 %
ERYTHROCYTE [SEDIMENTATION RATE] IN BLOOD BY WESTERGREN METHOD: 9 MM/HR
HCT VFR BLD CALC: 40.9 %
HGB BLD-MCNC: 13.2 G/DL
IMM GRANULOCYTES NFR BLD AUTO: 0.3 %
LYMPHOCYTES # BLD AUTO: 1.4 K/UL
LYMPHOCYTES NFR BLD AUTO: 21.2 %
MAN DIFF?: NORMAL
MCHC RBC-ENTMCNC: 29.5 PG
MCHC RBC-ENTMCNC: 32.3 GM/DL
MCV RBC AUTO: 91.5 FL
MONOCYTES # BLD AUTO: 0.35 K/UL
MONOCYTES NFR BLD AUTO: 5.3 %
NEUTROPHILS # BLD AUTO: 4.36 K/UL
NEUTROPHILS NFR BLD AUTO: 66.1 %
PLATELET # BLD AUTO: 312 K/UL
POTASSIUM SERPL-SCNC: 4.8 MMOL/L
PROT SERPL-MCNC: 6.6 G/DL
RBC # BLD: 4.47 M/UL
RBC # FLD: 13.3 %
SODIUM SERPL-SCNC: 140 MMOL/L
T3FREE SERPL-MCNC: 2.56 PG/ML
TSH SERPL-ACNC: 7.36 UIU/ML
WBC # FLD AUTO: 6.6 K/UL

## 2021-08-24 ENCOUNTER — TRANSCRIPTION ENCOUNTER (OUTPATIENT)
Age: 46
End: 2021-08-24

## 2021-08-25 ENCOUNTER — TRANSCRIPTION ENCOUNTER (OUTPATIENT)
Age: 46
End: 2021-08-25

## 2021-09-01 ENCOUNTER — TRANSCRIPTION ENCOUNTER (OUTPATIENT)
Age: 46
End: 2021-09-01

## 2021-10-22 ENCOUNTER — APPOINTMENT (OUTPATIENT)
Dept: RHEUMATOLOGY | Facility: CLINIC | Age: 46
End: 2021-10-22
Payer: COMMERCIAL

## 2021-10-22 VITALS
HEIGHT: 65 IN | WEIGHT: 216 LBS | BODY MASS INDEX: 35.99 KG/M2 | DIASTOLIC BLOOD PRESSURE: 64 MMHG | HEART RATE: 79 BPM | OXYGEN SATURATION: 98 % | SYSTOLIC BLOOD PRESSURE: 121 MMHG

## 2021-10-22 DIAGNOSIS — Z11.59 ENCOUNTER FOR SCREENING FOR OTHER VIRAL DISEASES: ICD-10-CM

## 2021-10-22 DIAGNOSIS — Z79.899 OTHER LONG TERM (CURRENT) DRUG THERAPY: ICD-10-CM

## 2021-10-22 DIAGNOSIS — M25.50 PAIN IN UNSPECIFIED JOINT: ICD-10-CM

## 2021-10-22 PROCEDURE — 99215 OFFICE O/P EST HI 40 MIN: CPT

## 2021-10-22 NOTE — HISTORY OF PRESENT ILLNESS
[FreeTextEntry1] : INTERVAL HX\par since last visit - had thyroid issues and started a diuretic - developed a toe swelling and extremely swollen and couldn’t even put a foot on it.  couldn’t walk on it for aweek.  very painful.\par - internist saw it and thought ias gout. \par - off the diuretic \par - adjusted the thyroid\par - on september 13 - developed headaches - started to take motrin - is also on the mesoxalic.  on the 17th was taken by ambulance to work from [employer] - and legs went numb and bp was 157/103 - had a migraine and couldn’t open eyes.  televisit with neuro.  gabapentin was increased to 600 mg - was treated with another mdp.  added topimax for the headache yesterday. going back to neuro in december.\par - skin great and the joitns are great - no swelling or AMs.  finally able to get weddin gring on after months  [Anorexia] : no anorexia [Weight Loss] : no weight loss [Malaise] : no malaise [Fever] : no fever [Depression] : no depression [Skin Lesions] : no lesions [Oral Ulcers] : no oral ulcers [Cough] : no cough [Dry Mouth] : no dry mouth [Dysphonia] : no dysphonia [Dysphagia] : no dysphagia [Shortness of Breath] : no shortness of breath [Joint Swelling] : no joint swelling [Joint Warmth] : no joint warmth [Joint Deformity] : no joint deformity [Decreased ROM] : no decreased range of motion [Morning Stiffness] : no morning stiffness [Difficulty Standing] : no difficulty standing [Myalgias] : no myalgias [Visual Changes] : no visual changes [Eye Redness] : no eye redness [Dry Eyes] : no dry eyes

## 2021-10-29 LAB
ALBUMIN SERPL ELPH-MCNC: 4.4 G/DL
ALP BLD-CCNC: 55 U/L
ALT SERPL-CCNC: 19 U/L
ANION GAP SERPL CALC-SCNC: 12 MMOL/L
AST SERPL-CCNC: 17 U/L
BASOPHILS # BLD AUTO: 0.06 K/UL
BASOPHILS NFR BLD AUTO: 0.8 %
BILIRUB SERPL-MCNC: 0.2 MG/DL
BUN SERPL-MCNC: 14 MG/DL
CALCIUM SERPL-MCNC: 9.3 MG/DL
CHLORIDE SERPL-SCNC: 105 MMOL/L
CO2 SERPL-SCNC: 22 MMOL/L
CREAT SERPL-MCNC: 0.95 MG/DL
CRP SERPL-MCNC: <3 MG/L
EOSINOPHIL # BLD AUTO: 0.58 K/UL
EOSINOPHIL NFR BLD AUTO: 7.8 %
ERYTHROCYTE [SEDIMENTATION RATE] IN BLOOD BY WESTERGREN METHOD: 7 MM/HR
HCT VFR BLD CALC: 41.9 %
HGB BLD-MCNC: 13.5 G/DL
IMM GRANULOCYTES NFR BLD AUTO: 0.1 %
LYMPHOCYTES # BLD AUTO: 2.25 K/UL
LYMPHOCYTES NFR BLD AUTO: 30.2 %
MAN DIFF?: NORMAL
MCHC RBC-ENTMCNC: 29.4 PG
MCHC RBC-ENTMCNC: 32.2 GM/DL
MCV RBC AUTO: 91.3 FL
MONOCYTES # BLD AUTO: 0.46 K/UL
MONOCYTES NFR BLD AUTO: 6.2 %
NEUTROPHILS # BLD AUTO: 4.08 K/UL
NEUTROPHILS NFR BLD AUTO: 54.9 %
PLATELET # BLD AUTO: 355 K/UL
POTASSIUM SERPL-SCNC: 4.2 MMOL/L
PROT SERPL-MCNC: 6.7 G/DL
RBC # BLD: 4.59 M/UL
RBC # FLD: 13.2 %
SODIUM SERPL-SCNC: 139 MMOL/L
TSH SERPL-ACNC: 2.38 UIU/ML
URATE SERPL-MCNC: 4.9 MG/DL
WBC # FLD AUTO: 7.44 K/UL

## 2021-11-04 ENCOUNTER — TRANSCRIPTION ENCOUNTER (OUTPATIENT)
Age: 46
End: 2021-11-04

## 2021-11-04 DIAGNOSIS — R94.4 ABNORMAL RESULTS OF KIDNEY FUNCTION STUDIES: ICD-10-CM

## 2021-12-01 ENCOUNTER — TRANSCRIPTION ENCOUNTER (OUTPATIENT)
Age: 46
End: 2021-12-01

## 2021-12-02 ENCOUNTER — TRANSCRIPTION ENCOUNTER (OUTPATIENT)
Age: 46
End: 2021-12-02

## 2022-01-05 ENCOUNTER — TRANSCRIPTION ENCOUNTER (OUTPATIENT)
Age: 47
End: 2022-01-05

## 2022-01-06 ENCOUNTER — TRANSCRIPTION ENCOUNTER (OUTPATIENT)
Age: 47
End: 2022-01-06

## 2022-01-28 ENCOUNTER — APPOINTMENT (OUTPATIENT)
Dept: RHEUMATOLOGY | Facility: CLINIC | Age: 47
End: 2022-01-28
Payer: COMMERCIAL

## 2022-01-28 VITALS
WEIGHT: 210 LBS | HEIGHT: 65 IN | SYSTOLIC BLOOD PRESSURE: 133 MMHG | HEART RATE: 76 BPM | DIASTOLIC BLOOD PRESSURE: 89 MMHG | OXYGEN SATURATION: 100 % | BODY MASS INDEX: 34.99 KG/M2

## 2022-01-28 DIAGNOSIS — R19.7 DIARRHEA, UNSPECIFIED: ICD-10-CM

## 2022-01-28 DIAGNOSIS — K58.9 IRRITABLE BOWEL SYNDROME W/OUT DIARRHEA: ICD-10-CM

## 2022-01-28 PROCEDURE — 99214 OFFICE O/P EST MOD 30 MIN: CPT | Mod: 25

## 2022-01-28 PROCEDURE — 36415 COLL VENOUS BLD VENIPUNCTURE: CPT

## 2022-01-28 NOTE — ASSESSMENT
[FreeTextEntry1] : 46 yo woman PMHX psoriasis, htn, graves, gerd, preeclampsia here with polyarthralgia\par \par here for choronic and acute issues\par \par #diarrhea\par doing better now but severe - working with GI.  weight loss \par -- discussed that the weight loss may help the psA/psO as well as response to drug \par -- check covid NC\par \par # PsA.  \par HLAB27 neg.  Previous treatment included skyrizi ([id number]) - effective only for skin, otezla (diarrhea), ? cosentyx vs stelara in the past.  started humira (2021 - present).  \par -- check inflammatory markers\par -- continue humira\par \par #BMI 35\par d/w patient that weight loss may actually be beneficial for her \par -- encouraged continued weight loss \par -- d/w patient perhaps low gluten diet  and high fiber fruits instead if okay by IBS  - patient to d/w gii on monday\par \par #migraines \par possibly related to hypertension ? related to nsaid use - was using two prior to this \par -- d/w patient NSAID - patient aware \par -- working with neuro as well\par \par # hypothyroidism \par currently being regulated \par \par #low vit d\par - supplement increased  and will \par \par #medication monitoring \par -- quant tb negative april 2021\par -- now on NSAID and +h/o gerd take with food and  on omeprazole\par -- check labs\par \par #vaccine counseling\par s/p vaccine x 2 shots.   applied for booster\par -- Patient advised that vaccine effectiveness may be blunted by some rheum meds.\par \par More than 50% of the encounter was spent counseling the patient on differential, workup, disease course and treatment/management.  Education was provided to the patient during this encounter.  All questions and concerns were addressed and answered.   The patient verbalized understanding and agreed to the plan. \par \par Patient has been instructed to call for an appointment if new symptoms develop.\par Patient has been instructed to make a followup appointment in 3 months.\par \par \par

## 2022-01-28 NOTE — HISTORY OF PRESENT ILLNESS
[FreeTextEntry1] : INTERVAL HX\par - in terms of new problem.  developed severe diarrhea - watery - urgency - no blood.   lasted about 3-4 weeks.  lost weight.  saw gi - colonoscopy negative for ibd.  labs negative for sprue.  dx with ibs and awaiting pathology \par - in terms of joint and skin - appears to be doing well \par - adherent to the humira no issues \par - no consituitoanl symptom s\par \par Rheum ROS \par - denies RP, sicca, oral ulcers, rashes, photosensitivity.   \par - denies constitutional symptoms, fatigue, night sweats.  weight is stable \par - Denies psoriasis, IBD, Inflammatory eye disease, STD, infectious diarrhea\par - breathes well without h/o of pleuritis, pericarditis.  renal function is normal and urine is not frothy\par - muscles are strong and there is no neurologic issues\par \par  [Anorexia] : no anorexia [Weight Loss] : no weight loss [Malaise] : no malaise [Fever] : no fever [Depression] : no depression [Skin Lesions] : no lesions [Oral Ulcers] : no oral ulcers [Cough] : no cough [Dry Mouth] : no dry mouth [Dysphonia] : no dysphonia [Dysphagia] : no dysphagia [Shortness of Breath] : no shortness of breath [Joint Swelling] : no joint swelling [Joint Warmth] : no joint warmth [Joint Deformity] : no joint deformity [Decreased ROM] : no decreased range of motion [Morning Stiffness] : no morning stiffness [Difficulty Standing] : no difficulty standing [Myalgias] : no myalgias [Visual Changes] : no visual changes [Eye Redness] : no eye redness [Dry Eyes] : no dry eyes

## 2022-01-28 NOTE — PHYSICAL EXAM
[General Appearance - Alert] : alert [General Appearance - In No Acute Distress] : in no acute distress [General Appearance - Well Nourished] : well nourished [General Appearance - Well Developed] : well developed [General Appearance - Well-Appearing] : healthy appearing [Sclera] : the sclera and conjunctiva were normal [Auscultation Breath Sounds / Voice Sounds] : lungs were clear to auscultation bilaterally [Heart Rate And Rhythm] : heart rate was normal and rhythm regular [Heart Sounds] : normal S1 and S2 [Heart Sounds Gallop] : no gallops [Murmurs] : no murmurs [Heart Sounds Pericardial Friction Rub] : no pericardial rub [Edema] : there was no peripheral edema [Veins - Varicosity Changes] : there were no varicosital changes [No CVA Tenderness] : no ~M costovertebral angle tenderness [No Spinal Tenderness] : no spinal tenderness [Abnormal Walk] : normal gait [Nail Clubbing] : no clubbing  or cyanosis of the fingernails [Musculoskeletal - Swelling] : no joint swelling seen [Motor Tone] : muscle strength and tone were normal [Skin Color & Pigmentation] : normal skin color and pigmentation [Skin Turgor] : normal skin turgor [] : no rash [Oriented To Time, Place, And Person] : oriented to person, place, and time [Impaired Insight] : insight and judgment were intact [Affect] : the affect was normal [FreeTextEntry1] : no synovitis apparent -no dacytlitis

## 2022-01-31 LAB
ALBUMIN SERPL ELPH-MCNC: 4.6 G/DL
ALP BLD-CCNC: 60 U/L
ALT SERPL-CCNC: 23 U/L
ANION GAP SERPL CALC-SCNC: 11 MMOL/L
AST SERPL-CCNC: 17 U/L
BASOPHILS # BLD AUTO: 0.06 K/UL
BASOPHILS NFR BLD AUTO: 0.9 %
BILIRUB SERPL-MCNC: 0.3 MG/DL
BUN SERPL-MCNC: 15 MG/DL
CALCIUM SERPL-MCNC: 9.4 MG/DL
CHLORIDE SERPL-SCNC: 107 MMOL/L
CO2 SERPL-SCNC: 25 MMOL/L
COVID-19 NUCLEOCAPSID  GAM ANTIBODY INTERPRETATION: POSITIVE
COVID-19 SPIKE DOMAIN ANTIBODY INTERPRETATION: POSITIVE
CREAT SERPL-MCNC: 0.78 MG/DL
CRP SERPL-MCNC: 3 MG/L
EOSINOPHIL # BLD AUTO: 0.53 K/UL
EOSINOPHIL NFR BLD AUTO: 7.7 %
ERYTHROCYTE [SEDIMENTATION RATE] IN BLOOD BY WESTERGREN METHOD: 6 MM/HR
HCT VFR BLD CALC: 43.1 %
HGB BLD-MCNC: 13.7 G/DL
IMM GRANULOCYTES NFR BLD AUTO: 0.4 %
LYMPHOCYTES # BLD AUTO: 1.43 K/UL
LYMPHOCYTES NFR BLD AUTO: 20.8 %
MAN DIFF?: NORMAL
MCHC RBC-ENTMCNC: 29 PG
MCHC RBC-ENTMCNC: 31.8 GM/DL
MCV RBC AUTO: 91.1 FL
MONOCYTES # BLD AUTO: 0.5 K/UL
MONOCYTES NFR BLD AUTO: 7.3 %
NEUTROPHILS # BLD AUTO: 4.33 K/UL
NEUTROPHILS NFR BLD AUTO: 62.9 %
PLATELET # BLD AUTO: 330 K/UL
POTASSIUM SERPL-SCNC: 4.4 MMOL/L
PROT SERPL-MCNC: 6.9 G/DL
RBC # BLD: 4.73 M/UL
RBC # FLD: 12.9 %
SARS-COV-2 AB SERPL IA-ACNC: >250 U/ML
SARS-COV-2 AB SERPL QL IA: 2.12 INDEX
SODIUM SERPL-SCNC: 143 MMOL/L
WBC # FLD AUTO: 6.88 K/UL

## 2022-02-01 ENCOUNTER — NON-APPOINTMENT (OUTPATIENT)
Age: 47
End: 2022-02-01

## 2022-02-02 ENCOUNTER — TRANSCRIPTION ENCOUNTER (OUTPATIENT)
Age: 47
End: 2022-02-02

## 2022-04-04 ENCOUNTER — TRANSCRIPTION ENCOUNTER (OUTPATIENT)
Age: 47
End: 2022-04-04

## 2022-04-11 PROBLEM — Z11.59 SCREENING FOR VIRAL DISEASE: Status: RESOLVED | Noted: 2021-01-08 | Resolved: 2021-10-22

## 2022-05-13 ENCOUNTER — NON-APPOINTMENT (OUTPATIENT)
Age: 47
End: 2022-05-13

## 2022-05-14 ENCOUNTER — TRANSCRIPTION ENCOUNTER (OUTPATIENT)
Age: 47
End: 2022-05-14

## 2022-05-17 ENCOUNTER — APPOINTMENT (OUTPATIENT)
Dept: DISASTER EMERGENCY | Facility: HOSPITAL | Age: 47
End: 2022-05-17

## 2022-05-17 ENCOUNTER — OUTPATIENT (OUTPATIENT)
Dept: OUTPATIENT SERVICES | Facility: HOSPITAL | Age: 47
LOS: 1 days | End: 2022-05-17

## 2022-05-17 VITALS
RESPIRATION RATE: 17 BRPM | TEMPERATURE: 99 F | OXYGEN SATURATION: 97 % | DIASTOLIC BLOOD PRESSURE: 83 MMHG | SYSTOLIC BLOOD PRESSURE: 118 MMHG | HEART RATE: 92 BPM

## 2022-05-17 VITALS
TEMPERATURE: 98 F | OXYGEN SATURATION: 100 % | WEIGHT: 207.01 LBS | RESPIRATION RATE: 17 BRPM | HEIGHT: 65 IN | HEART RATE: 100 BPM | DIASTOLIC BLOOD PRESSURE: 88 MMHG | SYSTOLIC BLOOD PRESSURE: 130 MMHG

## 2022-05-17 DIAGNOSIS — U07.1 COVID-19: ICD-10-CM

## 2022-05-17 RX ORDER — BEBTELOVIMAB 87.5 MG/ML
175 INJECTION, SOLUTION INTRAVENOUS ONCE
Refills: 0 | Status: COMPLETED | OUTPATIENT
Start: 2022-05-17 | End: 2022-05-17

## 2022-05-17 RX ADMIN — BEBTELOVIMAB 175 MILLIGRAM(S): 87.5 INJECTION, SOLUTION INTRAVENOUS at 11:28

## 2022-05-17 NOTE — MONOCLONAL ANTIBODY INFUSION - ASSESSMENT AND PLAN
ASSESSMENT:  Pt is a 46y Female recently diagnosed with Covid-19 infection who was referred for monoclonal antibody (Bebtelovimab) infusion after testing positive for COVID-19 on 5/14    PLAN:    I have reviewed the Bebtelovimab Emergency Use Authorization (EUA) and I have provided the patient or patient's caregiver with the following information:    1. The FDA has authorized emergency use of Bebtelovimab, it is not an FDA approved biological product & research is ongoing.  2. The patient or patient's caregiver has the option to accept or refuse administration of Bebtelovimab.  3. The significant known and potential risks and benefits of Bebtelovimab and the extent to which such risks and benefits are unknown.  4. Information on available alternative treatments and the risks/benefits of those alternatives.  5. Patient instructed to self-isolate & use infection control measures (e.g wear mask, isolate, social distance, avoid sharing personal items, clean & disinfect "high touch" surfaces, & frequent handwashing) according to the CDC guidelines.     - Injection procedure explained to patient  - Consent for mAb injection obtained; Patient verbalized understanding of plan and agrees to treatment  - Risk & benefits discussed/all questions answered to the best of my ability  - Bebtelovimab 175mg IVP x1 over ~ 1 minute  - Observe patient for one hour post medication administration  - D/C patient with fact sheet explaining symptoms to be aware of for the next couple of days along with contact information for the 24/7 clinical call center should they experience any symptoms  - Outpatient PCP follow up for further management recommendations

## 2022-05-17 NOTE — MONOCLONAL ANTIBODY INFUSION - EXAM
CC: Monoclonal Antibody Infusion/COVID 19 Positive  46y Female w/ PmHx of Grave's disease, Psoriatic arthritisHTN, obesity and recent dx of COVID-19 who presents today for elective Bebtelovimab infusion. Patient has been screened and was deemed to be a candidate by Dr. Murrieta    Symptoms/Criteria : congestion, cough, malaise,   Date of Symptom Onset:5/13  Date of Positive COVID PCR:5/14  Risk Profile includes:HTN obesity    Vaccination Status:Pfizer x3      Physical Exam/findings:  T(C): --  HR: --  BP: --  RR: --  SpO2: --    PE:   Appearance: obesity	  HEENT:   Normal oral mucosa, NC/AT  Lymphatic: No lymphadenopathy  Cardiovascular: Normal S1 S2, No JVD, No edema  Respiratory: Lungs clear to auscultation, no accessory muscle use or intracostal retraction  Gastrointestinal:  BS (+), Soft, non-tender, non-distended  Skin: Warm and dry  Neurologic: Non-focal, CN II- XII grossly intact  Extremities: Normal range of motion CC: Monoclonal Antibody Infusion/COVID 19 Positive  46y Female w/ PmHx of Grave's disease, Psoriatic arthritisHTN, obesity and recent dx of COVID-19 who presents today for elective Bebtelovimab infusion. Patient has been screened and was deemed to be a candidate by Dr. Murrieta    Symptoms/Criteria : congestion, cough, malaise,   Date of Symptom Onset:5/13  Date of Positive COVID PCR:5/14  Risk Profile includes:HTN obesity    Vaccination Status:Pfizer x3  Vital Signs Last 24 Hrs  T(C): 37 (17 May 2022 12:30), Max: 37 (17 May 2022 11:42)  T(F): 98.6 (17 May 2022 12:30), Max: 98.6 (17 May 2022 11:42)  HR: 92 (17 May 2022 12:30) (92 - 100)  BP: 118/83 (17 May 2022 12:30) (117/81 - 130/88)  BP(mean): --  RR: 17 (17 May 2022 12:30) (17 - 17)  SpO2: 97% (17 May 2022 12:30) (96% - 100%)    Physical Exam/findings:  T(C): --  HR: --  BP: --  RR: --  SpO2: --    PE:   Appearance: obesity	  HEENT:   Normal oral mucosa, NC/AT  Lymphatic: No lymphadenopathy  Cardiovascular: Normal S1 S2, No JVD, No edema  Respiratory: Lungs clear to auscultation, no accessory muscle use or intracostal retraction  Gastrointestinal:  BS (+), Soft, non-tender, non-distended  Skin: Warm and dry  Neurologic: Non-focal, CN II- XII grossly intact  Extremities: Normal range of motion CC: Monoclonal Antibody Infusion/COVID 19 Positive  46y Female w/ PmHx of Grave's disease, Psoriatic arthritisHTN, obesity and recent dx of COVID-19 who presents today for elective Bebtelovimab infusion. Patient has been screened and was deemed to be a candidate by Dr. Murrieta    Symptoms/Criteria : congestion, cough, malaise,   Date of Symptom Onset:5/13  Date of Positive COVID PCR:5/14  Risk Profile includes:HTN obesity    Vaccination Status:Pfizer x3  Vital Signs Last 24 Hrs  T(C): 37 (17 May 2022 12:30), Max: 37 (17 May 2022 11:42)  T(F): 98.6 (17 May 2022 12:30), Max: 98.6 (17 May 2022 11:42)  HR: 92 (17 May 2022 12:30) (92 - 100)  BP: 118/83 (17 May 2022 12:30) (117/81 - 130/88)  BP(mean): --  RR: 17 (17 May 2022 12:30) (17 - 17)  SpO2: 97% (17 May 2022 12:30) (96% - 100%)      PE:   Appearance: obesity	  HEENT:   Normal oral mucosa, NC/AT  Lymphatic: No lymphadenopathy  Cardiovascular: Normal S1 S2, No JVD, No edema  Respiratory: Lungs clear to auscultation, no accessory muscle use or intracostal retraction  Gastrointestinal:  BS (+), Soft, non-tender, non-distended  Skin: Warm and dry  Neurologic: Non-focal, CN II- XII grossly intact  Extremities: Normal range of motion

## 2022-05-18 ENCOUNTER — TRANSCRIPTION ENCOUNTER (OUTPATIENT)
Age: 47
End: 2022-05-18

## 2022-05-19 ENCOUNTER — TRANSCRIPTION ENCOUNTER (OUTPATIENT)
Age: 47
End: 2022-05-19

## 2022-05-31 ENCOUNTER — APPOINTMENT (OUTPATIENT)
Dept: RHEUMATOLOGY | Facility: CLINIC | Age: 47
End: 2022-05-31
Payer: COMMERCIAL

## 2022-05-31 ENCOUNTER — APPOINTMENT (OUTPATIENT)
Dept: ALLERGY | Facility: CLINIC | Age: 47
End: 2022-05-31
Payer: COMMERCIAL

## 2022-05-31 VITALS
HEART RATE: 104 BPM | DIASTOLIC BLOOD PRESSURE: 95 MMHG | BODY MASS INDEX: 34.45 KG/M2 | WEIGHT: 207 LBS | OXYGEN SATURATION: 99 % | SYSTOLIC BLOOD PRESSURE: 137 MMHG

## 2022-05-31 VITALS
BODY MASS INDEX: 35.65 KG/M2 | SYSTOLIC BLOOD PRESSURE: 130 MMHG | HEART RATE: 97 BPM | WEIGHT: 214 LBS | DIASTOLIC BLOOD PRESSURE: 91 MMHG | HEIGHT: 65 IN | OXYGEN SATURATION: 97 %

## 2022-05-31 PROCEDURE — 99203 OFFICE O/P NEW LOW 30 MIN: CPT

## 2022-05-31 PROCEDURE — 99215 OFFICE O/P EST HI 40 MIN: CPT | Mod: 25

## 2022-05-31 PROCEDURE — 36415 COLL VENOUS BLD VENIPUNCTURE: CPT

## 2022-05-31 RX ORDER — DULOXETINE HYDROCHLORIDE 60 MG/1
60 CAPSULE, DELAYED RELEASE PELLETS ORAL
Qty: 90 | Refills: 0 | Status: ACTIVE | COMMUNITY
Start: 2022-03-23

## 2022-05-31 RX ORDER — DULOXETINE HYDROCHLORIDE 30 MG/1
30 CAPSULE, DELAYED RELEASE PELLETS ORAL
Qty: 90 | Refills: 0 | Status: ACTIVE | COMMUNITY
Start: 2021-11-30

## 2022-05-31 RX ORDER — LEVOCETIRIZINE DIHYDROCHLORIDE 5 MG/1
5 TABLET ORAL
Qty: 30 | Refills: 0 | Status: ACTIVE | COMMUNITY
Start: 2022-04-13

## 2022-05-31 RX ORDER — GABAPENTIN 300 MG/1
300 CAPSULE ORAL
Qty: 60 | Refills: 0 | Status: ACTIVE | COMMUNITY
Start: 2021-07-07

## 2022-05-31 RX ORDER — CLINDAMYCIN PHOSPHATE 10 MG/ML
1 SOLUTION TOPICAL
Qty: 60 | Refills: 0 | Status: ACTIVE | COMMUNITY
Start: 2021-12-08

## 2022-05-31 RX ORDER — BUDESONIDE 3 MG/1
3 CAPSULE, COATED PELLETS ORAL
Qty: 60 | Refills: 0 | Status: DISCONTINUED | COMMUNITY
Start: 2022-03-04 | End: 2022-05-31

## 2022-05-31 RX ORDER — BETAMETHASONE DIPROPIONATE 0.5 MG/G
0.05 CREAM, AUGMENTED TOPICAL TWICE DAILY
Qty: 60 | Refills: 0 | Status: ACTIVE | COMMUNITY
Start: 2022-05-31 | End: 1900-01-01

## 2022-05-31 RX ORDER — AMOXICILLIN AND CLAVULANATE POTASSIUM 875; 125 MG/1; MG/1
875-125 TABLET, COATED ORAL
Qty: 14 | Refills: 0 | Status: DISCONTINUED | COMMUNITY
Start: 2021-12-17

## 2022-05-31 RX ORDER — CETIRIZINE HYDROCHLORIDE 10 MG/1
10 CAPSULE, LIQUID FILLED ORAL
Qty: 21 | Refills: 0 | Status: ACTIVE | COMMUNITY
Start: 2022-05-31 | End: 1900-01-01

## 2022-05-31 RX ORDER — LEVOTHYROXINE SODIUM 200 UG/1
200 TABLET ORAL
Qty: 90 | Refills: 0 | Status: ACTIVE | COMMUNITY
Start: 2022-01-31

## 2022-05-31 RX ORDER — TRIAMCINOLONE ACETONIDE 1 MG/G
0.1 CREAM TOPICAL
Qty: 80 | Refills: 0 | Status: ACTIVE | COMMUNITY
Start: 2022-04-13

## 2022-05-31 RX ORDER — ESCITALOPRAM OXALATE 10 MG/1
10 TABLET ORAL
Qty: 90 | Refills: 0 | Status: DISCONTINUED | COMMUNITY
Start: 2021-10-06

## 2022-05-31 RX ORDER — LOSARTAN POTASSIUM 50 MG/1
50 TABLET, FILM COATED ORAL
Qty: 90 | Refills: 0 | Status: ACTIVE | COMMUNITY
Start: 2022-04-12

## 2022-05-31 NOTE — REVIEW OF SYSTEMS
[Headache] : headache [Joint Swelling] : joint swelling  [Nl] : Genitourinary [FreeTextEntry7] : colitis  [de-identified] : Graves disease on synthrod

## 2022-05-31 NOTE — PHYSICAL EXAM
[Alert] : alert [Well Nourished] : well nourished [Healthy Appearance] : healthy appearance [No Acute Distress] : no acute distress [Well Developed] : well developed [Normal Voice/Communication] : normal voice communication [No Neck Mass] : no neck mass was observed [No LAD] : no lymphadenopathy [No Thyroid Mass] : no thyroid mass [Normal Rate and Effort] : normal respiratory rhythm and effort [No Crackles] : no crackles [No Retractions] : no retractions [Bilateral Audible Breath Sounds] : bilateral audible breath sounds [Wheezing] : no wheezing was heard [Normal Rate] : heart rate was normal  [Normal S1, S2] : normal S1 and S2 [No murmur] : no murmur [Regular Rhythm] : with a regular rhythm [Normal Cervical Lymph Nodes] : cervical [No clubbing] : no clubbing [No Cyanosis] : no cyanosis [Normal Mood] : mood was normal [Normal Affect] : affect was normal [Judgment and Insight Age Appropriate] : judgement and insight is age appropriate [Alert, Awake, Oriented as Age-Appropriate] : alert, awake, oriented as age appropriate [de-identified] : streaks of papules and scattered individual papules on extremeties

## 2022-05-31 NOTE — SOCIAL HISTORY
[Spouse/Partner] : spouse/partner [House] : [unfilled] lives in a house  [] :  [de-identified] : 2 daughters  [FreeTextEntry2] : [employer]  [Smokers in Household] : there are no smokers in the home

## 2022-05-31 NOTE — HISTORY OF PRESENT ILLNESS
[Food Allergies] : food allergies [de-identified] : Patient has been followed by rheumatology for psoriatic arthritis and presently being treated with Humira.   In addition, she has a history of lymphocytic colitis which had been treated with budesonide.  She had COVID on 5/13/22 - SOB - coughing - sneezing - body aches x 5 days and she was treated with monoclonal antibody infusion 5/17/22.    About 4 days later she had a rash on her right forearm and gradually spread to her body.   She applied topical steroid creams to the rash.   The rash is fixed in nature.  \par \par She gardens a lot - she was gardening on 5/21/22 - the rash started two days later.  \par \par She has never had poison ivy.  \par \par EIB as teenager.   Intermittent seasonal alleriges

## 2022-05-31 NOTE — ASSESSMENT
[FreeTextEntry1] : Poison ivy dermatitis:\par \par Patient will take Medrol pack\par Allegra 180 mg BID \par Diprolene cream BID to the affected areas

## 2022-06-01 ENCOUNTER — TRANSCRIPTION ENCOUNTER (OUTPATIENT)
Age: 47
End: 2022-06-01

## 2022-06-01 LAB
ALBUMIN SERPL ELPH-MCNC: 4.6 G/DL
ALP BLD-CCNC: 79 U/L
ALT SERPL-CCNC: 18 U/L
ANION GAP SERPL CALC-SCNC: 12 MMOL/L
AST SERPL-CCNC: 16 U/L
BASOPHILS # BLD AUTO: 0.07 K/UL
BASOPHILS NFR BLD AUTO: 1 %
BILIRUB SERPL-MCNC: 0.2 MG/DL
BUN SERPL-MCNC: 10 MG/DL
CALCIUM SERPL-MCNC: 9.1 MG/DL
CHLORIDE SERPL-SCNC: 106 MMOL/L
CO2 SERPL-SCNC: 25 MMOL/L
CREAT SERPL-MCNC: 0.76 MG/DL
CRP SERPL-MCNC: 5 MG/L
EGFR: 99 ML/MIN/1.73M2
EOSINOPHIL # BLD AUTO: 0.47 K/UL
EOSINOPHIL NFR BLD AUTO: 6.7 %
ERYTHROCYTE [SEDIMENTATION RATE] IN BLOOD BY WESTERGREN METHOD: 10 MM/HR
HAV IGM SER QL: NONREACTIVE
HBV CORE IGM SER QL: NONREACTIVE
HBV SURFACE AG SER QL: NONREACTIVE
HCT VFR BLD CALC: 41.1 %
HCV AB SER QL: NONREACTIVE
HCV S/CO RATIO: 0.07 S/CO
HGB BLD-MCNC: 13 G/DL
IMM GRANULOCYTES NFR BLD AUTO: 0.3 %
LYMPHOCYTES # BLD AUTO: 1.68 K/UL
LYMPHOCYTES NFR BLD AUTO: 24.1 %
MAN DIFF?: NORMAL
MCHC RBC-ENTMCNC: 27.4 PG
MCHC RBC-ENTMCNC: 31.6 GM/DL
MCV RBC AUTO: 86.7 FL
MONOCYTES # BLD AUTO: 0.57 K/UL
MONOCYTES NFR BLD AUTO: 8.2 %
NEUTROPHILS # BLD AUTO: 4.17 K/UL
NEUTROPHILS NFR BLD AUTO: 59.7 %
PLATELET # BLD AUTO: 329 K/UL
POTASSIUM SERPL-SCNC: 4.8 MMOL/L
PROT SERPL-MCNC: 6.7 G/DL
RBC # BLD: 4.74 M/UL
RBC # FLD: 13.6 %
SODIUM SERPL-SCNC: 142 MMOL/L
WBC # FLD AUTO: 6.98 K/UL

## 2022-06-01 NOTE — HISTORY OF PRESENT ILLNESS
[___ Month(s) Ago] : [unfilled] month(s) ago [FreeTextEntry1] : INTERVAL HX\par here for several issues both old and new\par \par -5/31/22: lymphocytic colitis now inactive s/p 6 weeks of PO budesonide a few months ago per GI.\par - active issue: had COVID-19 infection for 3rd time (positive COVID test on 3/13/22, then had monoclonal antibody infusion on 3/17- felt well immediately after, but several days after developed wheals/itching in R arm, the site of infusion, followed by gradual spread to L. arm and then both legs. Patient in mild distress from so much itching. Tried benadryl once at home but it caused no relief in symptoms, even briefly. States that the hives have not gone and reappeared, but instead have been there constantly for past week with new formation in past couple of days on legs. \par - in terms of joint and skin - pt states psoriasis very active on scalp, uses medicated shampoos aside from humira; joints are fine but she does get b/l hand swelling each morning lasting for ~2 hrs each day; mild R 1st toe pain ever since a flareup of pain months ago- has never returned to baseline. There was a question of gout in past, but serum uric acid was normal at the time.\par - adherent to the humira no issues \par - does not report side effects from Humira, last dose approx 1 week ago \par \par Rheum ROS \par - denies RP, sicca, oral ulcers, or photosensitivity.   \par - denies constitutional symptoms, fatigue, night sweats.  weight is stable \par - Denies IBD, Inflammatory eye disease, STD, infectious diarrhea\par - breathes well without h/o of pleuritis, pericarditis.  renal function is normal and urine is not frothy\par - muscles are strong and there is no neurologic issues\par - saw ophtho recently, aside from increase in her eyeglasses power, was not told of any inflammatory eye disease. \par  [Anorexia] : no anorexia [Weight Loss] : no weight loss [Malaise] : no malaise [Fever] : no fever [Depression] : no depression [Skin Lesions] : no lesions [Oral Ulcers] : no oral ulcers [Cough] : no cough [Dry Mouth] : no dry mouth [Dysphonia] : no dysphonia [Dysphagia] : no dysphagia [Shortness of Breath] : no shortness of breath [Joint Swelling] : no joint swelling [Joint Warmth] : no joint warmth [Joint Deformity] : no joint deformity [Decreased ROM] : no decreased range of motion [Morning Stiffness] : no morning stiffness [Difficulty Standing] : no difficulty standing [Myalgias] : no myalgias [Visual Changes] : no visual changes [Eye Redness] : no eye redness [Dry Eyes] : no dry eyes

## 2022-06-01 NOTE — REVIEW OF SYSTEMS
[Negative] : Heme/Lymph [As Noted in HPI] : as noted in HPI [Arthralgias] : arthralgias [Joint Swelling] : joint swelling [Joint Stiffness] : joint stiffness [Skin Lesions] : skin lesion [Itching] : itching [Eye Pain] : no eye pain [Red Eyes] : eyes not red [Abdominal Pain] : no abdominal pain [Diarrhea] : no diarrhea [Heartburn] : no heartburn [Melena] : no melena [de-identified] : hives in b/l arms and legs, and chronic scalp psoriasis which she states is active

## 2022-06-01 NOTE — ASSESSMENT
[FreeTextEntry1] : 46 yo woman PMHX psoriatic arthritis, htn, graves, gerd, preeclampsia here for followup. \par \par here for chronic and acute issues\par \par #Urticaria\par Suspect related to monoclonal antibody infusion on 5/17/22, as pt states this started a few days after the infusion  and rash started on same arm as site of infusion.  denies other compatible history.   hives last more than 24 hours, no scarring.   not responsive topical steroid creams\par -- will refer to immunology at this time.\par -- recommended Zyrtec daily PRN for itching/rash, if this does not help after taking for 7 days, a medrol dose pack was also prescribed.\par -- urticaria autoimmune panel also ordered today. \par \par # PsA.  \par HLAB27 neg.  Previous treatment included skyrizi ([id number]) - effective only for skin, otezla (diarrhea), ? cosentyx vs stelara in the past.  started Humira (2021 - present).  \par -- check inflammatory markers\par -- continue humira for now, although may have to reconsider treatment in future given ongoing scalp involvement.\par \par # Lymphocytic colitis\par -- improved after 6 weeks treatment with budesonide \par \par #BMI 35\par d/w patient on past visits that weight loss may actually be beneficial for her \par -- encouraged continued weight loss \par \par #migraines \par possibly related to hypertension ? related to nsaid use - was using two prior to this \par -- d/w patient NSAID - patient aware \par -- working with neuro as well\par \par # hypothyroidism \par currently being regulated \par \par #low vit d\par - on supplementation \par \par #medication monitoring \par -- quant tb negative april 2021 - check today \par -- hep negative april 2021 - check today\par --  +h/o gerd take with food and  on omeprazole\par -- check labs\par \par #vaccine counseling\par s/p vaccine x 2 shots with booster- last COVID infection was 3/13/22. \par \par \par More than 50% of the encounter was spent counseling the patient on differential, workup, disease course and treatment/management.  Education was provided to the patient during this encounter.  All questions and concerns were addressed and answered.   The patient verbalized understanding and agreed to the plan. \par \par Patient has been instructed to call for an appointment if new symptoms develop.\par Patient has been instructed to make a followup appointment in 3 months.\par \par Time spent on the encounter included, but is not limited to, preparing to see the patient, obtaining and/or reviewing separately obtained history, performing the evaluation, counseling and educating, independently interpreting results with communication to patient, order placement, referring and/or communicating with other health professionals as described, and documenting clinical information in the electronic health record\par \par \par

## 2022-06-02 LAB
C4 SERPL-MCNC: 25 MG/DL
THYROGLOB AB SERPL-ACNC: <20 IU/ML
THYROPEROXIDASE AB SERPL IA-ACNC: <10 IU/ML

## 2022-06-03 ENCOUNTER — TRANSCRIPTION ENCOUNTER (OUTPATIENT)
Age: 47
End: 2022-06-03

## 2022-06-04 ENCOUNTER — TRANSCRIPTION ENCOUNTER (OUTPATIENT)
Age: 47
End: 2022-06-04

## 2022-06-07 ENCOUNTER — TRANSCRIPTION ENCOUNTER (OUTPATIENT)
Age: 47
End: 2022-06-07

## 2022-06-15 ENCOUNTER — NON-APPOINTMENT (OUTPATIENT)
Age: 47
End: 2022-06-15

## 2022-06-28 ENCOUNTER — TRANSCRIPTION ENCOUNTER (OUTPATIENT)
Age: 47
End: 2022-06-28

## 2022-06-29 ENCOUNTER — APPOINTMENT (OUTPATIENT)
Dept: PULMONOLOGY | Facility: CLINIC | Age: 47
End: 2022-06-29

## 2022-06-29 ENCOUNTER — EMERGENCY (EMERGENCY)
Facility: HOSPITAL | Age: 47
LOS: 1 days | Discharge: HOME | End: 2022-06-29
Attending: EMERGENCY MEDICINE | Admitting: EMERGENCY MEDICINE
Payer: COMMERCIAL

## 2022-06-29 VITALS
OXYGEN SATURATION: 100 % | SYSTOLIC BLOOD PRESSURE: 141 MMHG | HEART RATE: 89 BPM | RESPIRATION RATE: 18 BRPM | TEMPERATURE: 98 F | DIASTOLIC BLOOD PRESSURE: 89 MMHG

## 2022-06-29 VITALS
RESPIRATION RATE: 18 BRPM | SYSTOLIC BLOOD PRESSURE: 142 MMHG | DIASTOLIC BLOOD PRESSURE: 96 MMHG | HEIGHT: 65 IN | OXYGEN SATURATION: 100 % | TEMPERATURE: 98 F | HEART RATE: 120 BPM | WEIGHT: 207.9 LBS

## 2022-06-29 VITALS
WEIGHT: 208 LBS | HEART RATE: 106 BPM | DIASTOLIC BLOOD PRESSURE: 89 MMHG | BODY MASS INDEX: 34.66 KG/M2 | SYSTOLIC BLOOD PRESSURE: 140 MMHG | TEMPERATURE: 98.1 F | OXYGEN SATURATION: 97 % | HEIGHT: 65 IN

## 2022-06-29 LAB
ALBUMIN SERPL ELPH-MCNC: 3.6 G/DL — SIGNIFICANT CHANGE UP (ref 3.3–5)
ALP SERPL-CCNC: 62 U/L — SIGNIFICANT CHANGE UP (ref 40–120)
ALT FLD-CCNC: 33 U/L — SIGNIFICANT CHANGE UP (ref 12–78)
ANION GAP SERPL CALC-SCNC: 3 MMOL/L — LOW (ref 5–17)
APTT BLD: 30.3 SEC — SIGNIFICANT CHANGE UP (ref 27.5–35.5)
AST SERPL-CCNC: 19 U/L — SIGNIFICANT CHANGE UP (ref 15–37)
BASOPHILS # BLD AUTO: 0.07 K/UL — SIGNIFICANT CHANGE UP (ref 0–0.2)
BASOPHILS NFR BLD AUTO: 0.9 % — SIGNIFICANT CHANGE UP (ref 0–2)
BILIRUB SERPL-MCNC: 0.2 MG/DL — SIGNIFICANT CHANGE UP (ref 0.2–1.2)
BUN SERPL-MCNC: 16 MG/DL — SIGNIFICANT CHANGE UP (ref 7–23)
CALCIUM SERPL-MCNC: 9 MG/DL — SIGNIFICANT CHANGE UP (ref 8.5–10.1)
CHLORIDE SERPL-SCNC: 109 MMOL/L — HIGH (ref 96–108)
CK SERPL-CCNC: 69 U/L — SIGNIFICANT CHANGE UP (ref 26–192)
CO2 SERPL-SCNC: 29 MMOL/L — SIGNIFICANT CHANGE UP (ref 22–31)
CREAT SERPL-MCNC: 0.89 MG/DL — SIGNIFICANT CHANGE UP (ref 0.5–1.3)
D DIMER BLD IA.RAPID-MCNC: <150 NG/ML DDU — SIGNIFICANT CHANGE UP
EGFR: 82 ML/MIN/1.73M2 — SIGNIFICANT CHANGE UP
EOSINOPHIL # BLD AUTO: 0.61 K/UL — HIGH (ref 0–0.5)
EOSINOPHIL NFR BLD AUTO: 7.5 % — HIGH (ref 0–6)
GLUCOSE SERPL-MCNC: 98 MG/DL — SIGNIFICANT CHANGE UP (ref 70–99)
HCG SERPL-ACNC: <1 MIU/ML — SIGNIFICANT CHANGE UP
HCT VFR BLD CALC: 39.5 % — SIGNIFICANT CHANGE UP (ref 34.5–45)
HGB BLD-MCNC: 12.7 G/DL — SIGNIFICANT CHANGE UP (ref 11.5–15.5)
IMM GRANULOCYTES NFR BLD AUTO: 0.2 % — SIGNIFICANT CHANGE UP (ref 0–1.5)
INR BLD: 0.96 RATIO — SIGNIFICANT CHANGE UP (ref 0.88–1.16)
LYMPHOCYTES # BLD AUTO: 2.05 K/UL — SIGNIFICANT CHANGE UP (ref 1–3.3)
LYMPHOCYTES # BLD AUTO: 25.2 % — SIGNIFICANT CHANGE UP (ref 13–44)
MCHC RBC-ENTMCNC: 28.3 PG — SIGNIFICANT CHANGE UP (ref 27–34)
MCHC RBC-ENTMCNC: 32.2 GM/DL — SIGNIFICANT CHANGE UP (ref 32–36)
MCV RBC AUTO: 88 FL — SIGNIFICANT CHANGE UP (ref 80–100)
MONOCYTES # BLD AUTO: 0.71 K/UL — SIGNIFICANT CHANGE UP (ref 0–0.9)
MONOCYTES NFR BLD AUTO: 8.7 % — SIGNIFICANT CHANGE UP (ref 2–14)
NEUTROPHILS # BLD AUTO: 4.68 K/UL — SIGNIFICANT CHANGE UP (ref 1.8–7.4)
NEUTROPHILS NFR BLD AUTO: 57.5 % — SIGNIFICANT CHANGE UP (ref 43–77)
NRBC # BLD: 0 /100 WBCS — SIGNIFICANT CHANGE UP (ref 0–0)
PLATELET # BLD AUTO: 330 K/UL — SIGNIFICANT CHANGE UP (ref 150–400)
POTASSIUM SERPL-MCNC: 4.6 MMOL/L — SIGNIFICANT CHANGE UP (ref 3.5–5.3)
POTASSIUM SERPL-SCNC: 4.6 MMOL/L — SIGNIFICANT CHANGE UP (ref 3.5–5.3)
PROT SERPL-MCNC: 6.9 G/DL — SIGNIFICANT CHANGE UP (ref 6–8.3)
PROTHROM AB SERPL-ACNC: 11.2 SEC — SIGNIFICANT CHANGE UP (ref 10.5–13.4)
RBC # BLD: 4.49 M/UL — SIGNIFICANT CHANGE UP (ref 3.8–5.2)
RBC # FLD: 13.7 % — SIGNIFICANT CHANGE UP (ref 10.3–14.5)
SARS-COV-2 RNA SPEC QL NAA+PROBE: SIGNIFICANT CHANGE UP
SODIUM SERPL-SCNC: 141 MMOL/L — SIGNIFICANT CHANGE UP (ref 135–145)
TROPONIN I, HIGH SENSITIVITY RESULT: <3 NG/L — SIGNIFICANT CHANGE UP
WBC # BLD: 8.14 K/UL — SIGNIFICANT CHANGE UP (ref 3.8–10.5)
WBC # FLD AUTO: 8.14 K/UL — SIGNIFICANT CHANGE UP (ref 3.8–10.5)

## 2022-06-29 PROCEDURE — 36415 COLL VENOUS BLD VENIPUNCTURE: CPT

## 2022-06-29 PROCEDURE — 71275 CT ANGIOGRAPHY CHEST: CPT | Mod: 26,MA

## 2022-06-29 PROCEDURE — 99205 OFFICE O/P NEW HI 60 MIN: CPT

## 2022-06-29 PROCEDURE — U0003: CPT

## 2022-06-29 PROCEDURE — 85610 PROTHROMBIN TIME: CPT

## 2022-06-29 PROCEDURE — 85379 FIBRIN DEGRADATION QUANT: CPT

## 2022-06-29 PROCEDURE — 82550 ASSAY OF CK (CPK): CPT

## 2022-06-29 PROCEDURE — 71046 X-RAY EXAM CHEST 2 VIEWS: CPT

## 2022-06-29 PROCEDURE — 84484 ASSAY OF TROPONIN QUANT: CPT

## 2022-06-29 PROCEDURE — 85730 THROMBOPLASTIN TIME PARTIAL: CPT

## 2022-06-29 PROCEDURE — 85025 COMPLETE CBC W/AUTO DIFF WBC: CPT

## 2022-06-29 PROCEDURE — 80053 COMPREHEN METABOLIC PANEL: CPT

## 2022-06-29 PROCEDURE — 99285 EMERGENCY DEPT VISIT HI MDM: CPT | Mod: 25

## 2022-06-29 PROCEDURE — U0005: CPT

## 2022-06-29 PROCEDURE — 84702 CHORIONIC GONADOTROPIN TEST: CPT

## 2022-06-29 PROCEDURE — 93005 ELECTROCARDIOGRAM TRACING: CPT

## 2022-06-29 PROCEDURE — 71046 X-RAY EXAM CHEST 2 VIEWS: CPT | Mod: 26

## 2022-06-29 PROCEDURE — 99285 EMERGENCY DEPT VISIT HI MDM: CPT

## 2022-06-29 PROCEDURE — 93010 ELECTROCARDIOGRAM REPORT: CPT

## 2022-06-29 PROCEDURE — 71275 CT ANGIOGRAPHY CHEST: CPT | Mod: MA

## 2022-06-29 RX ORDER — METHYLPREDNISOLONE 4 MG/1
4 TABLET ORAL
Qty: 1 | Refills: 0 | Status: DISCONTINUED | COMMUNITY
Start: 2022-05-31 | End: 2022-06-29

## 2022-06-29 RX ORDER — SODIUM CHLORIDE 9 MG/ML
1000 INJECTION INTRAMUSCULAR; INTRAVENOUS; SUBCUTANEOUS ONCE
Refills: 0 | Status: COMPLETED | OUTPATIENT
Start: 2022-06-29 | End: 2022-06-29

## 2022-06-29 RX ORDER — SODIUM SULFATE, POTASSIUM SULFATE, MAGNESIUM SULFATE 17.5; 3.13; 1.6 G/ML; G/ML; G/ML
17.5-3.13-1.6 SOLUTION, CONCENTRATE ORAL
Qty: 354 | Refills: 0 | Status: DISCONTINUED | COMMUNITY
Start: 2022-01-12 | End: 2022-06-29

## 2022-06-29 RX ADMIN — SODIUM CHLORIDE 1000 MILLILITER(S): 9 INJECTION INTRAMUSCULAR; INTRAVENOUS; SUBCUTANEOUS at 17:39

## 2022-06-29 NOTE — ED ADULT NURSE NOTE - NURSING MUSC JOINTS
Transitional Care Management - Hospital Discharge Notification    Mr. Klever Hutchinson was discharged from the hospital on 4/23/21 called on  4/26/2021, without any special services. Mr. Hutchinson has a Transitional Care Management follow-up appointment already scheduled with  Abisai Peacock MD on 4/29/21.    Please remember to initiate the Transitional Care Management telephone encounter within 2 business days of called for appointment on 4/26/21 but discharged on 4/23/21..     no pain, swelling or deformity of joints

## 2022-06-29 NOTE — PHYSICAL EXAM
English [No Acute Distress] : no acute distress [Normal Oropharynx] : normal oropharynx [Normal Appearance] : normal appearance [No Neck Mass] : no neck mass [Normal Rate/Rhythm] : normal rate/rhythm [Normal S1, S2] : normal s1, s2 [No Murmurs] : no murmurs [No Resp Distress] : no resp distress [Clear to Auscultation Bilaterally] : clear to auscultation bilaterally [No Abnormalities] : no abnormalities [Benign] : benign [Normal Gait] : normal gait [No Clubbing] : no clubbing [No Cyanosis] : no cyanosis [FROM] : FROM [Normal Color/ Pigmentation] : normal color/ pigmentation [No Focal Deficits] : no focal deficits [Oriented x3] : oriented x3 [Normal Affect] : normal affect [TextBox_105] : trace

## 2022-06-29 NOTE — ED ADULT NURSE NOTE - OBJECTIVE STATEMENT
Patient is 47yo F presents with c/o worsening dyspnea on exertion since being diagnosed with Covid19 on May 13 2022. Patient reports she went to her pulmonologist today and walking from car to office she was out of breath, when they did a walking test on her, her HR elevated to 130s but pulse ox remained above 97% on room air.  Patient admits to palpitations and dizziness during these episode. Patient also c/o right anterior chest wall pain only with deep breath. Patient denies fevers, chills, syncope episodes, leg pain or swelling, back pain, abdominal pain, N/V/D, urinary complaints.

## 2022-06-29 NOTE — ED PROVIDER NOTE - OBJECTIVE STATEMENT
45 yo F w/ hx HTN , psoriasis, psoriatic arthritis, hypothyroid  prev vaccinated x 3 for covid p/w persistent SOB since had covid mid May 2022. Pt seen by new pulm today - slight tachycardia when walking - sent to ed for CTA. no hypoxia both at rest or with walking today. no fever/chills. no further cough /uri. no neck/ back pain. no abd pain. no n/v/d. no weakness/ dizziness. no other acute co or changes.
Alert and oriented to person, place and time

## 2022-06-29 NOTE — ED PROVIDER NOTE - IV ALTEPLASE EXCL ABS HIDDEN
[Normal Sclera/Conjunctiva] : normal sclera/conjunctiva [PERRL] : pupils equal round and reactive to light [Normal Oropharynx] : the oropharynx was normal [No JVD] : no jugular venous distention [No Lymphadenopathy] : no lymphadenopathy [Normal Rate] : normal rate  [Regular Rhythm] : with a regular rhythm show [Normal S1, S2] : normal S1 and S2 [No Edema] : there was no peripheral edema [No Extremity Clubbing/Cyanosis] : no extremity clubbing/cyanosis [Normal Anterior Cervical Nodes] : no anterior cervical lymphadenopathy [No CVA Tenderness] : no CVA  tenderness [No Joint Swelling] : no joint swelling [Grossly Normal Strength/Tone] : grossly normal strength/tone [No Rash] : no rash [No Skin Lesions] : no skin lesions [Coordination Grossly Intact] : coordination grossly intact [Normal Gait] : normal gait [Normal] : affect was normal and insight and judgment were intact

## 2022-06-29 NOTE — ED ADULT NURSE NOTE - NSICDXPASTMEDICALHX_GEN_ALL_CORE_FT
PAST MEDICAL HISTORY:  GERD (gastroesophageal reflux disease)     HTN (hypertension)     Hypothyroid     Migraine     Psoriasis     Psoriatic arthritis

## 2022-06-29 NOTE — ED PROVIDER NOTE - ENMT, MLM
Airway patent, Nasal mucosa clear. Mouth with normal mucosa. Throat has no vesicles, no oropharyngeal exudates and uvula is midline. neck supple . no meningeal signs.

## 2022-06-29 NOTE — HISTORY OF PRESENT ILLNESS
[Never] : never [TextBox_4] : Ms. CARROLL MONTOYA is a 46 year old woman with psoriatic arthritis (on Humira), lymphocytic colitis and COVID 5/2022 here for evaluation.\par \par Had COVID x 3. April 2020 - loss of smell/taste, fatigue. No other sx. Dec 2021 - sinus congestion. Both times mild sx, fully recovered\par \par Tested positive 5/13. Was quite sick - had cough, SOB, fatigue, HA, aches. S/p MAB on 5/17. Cough and other sx resolved but she continued feeling very SOB. Very SOB after walking 50 feet.  She feels palpitations. She has sharp pleuritic pain on the right side for past 2 week. Notices some dizziness, no syncope. \par She is very tired. \par \par \par PMH: Psoriatic arthritis (on Humira), lymphocytic colitis, HTN\par Meds: per chart\par All: NKDA\par SH: never smoker. NO exposures\par FH: dad, brother - DM. Parents - HNT\par PMD: JUANITO SANTA MD\par Immunizations: covid vaccinaed and boosted.\par

## 2022-06-29 NOTE — ED PROVIDER NOTE - CARE PROVIDER_API CALL
BETI SANTA  [facility]  [address] [place] [address]  Phone: [phone]  Fax: [phone]  Follow Up Time:     Ramonita Cage)  Internal Medicine; Pulmonary Disease  [facility] [place] [address]  Phone: [phone]  Fax: [phone]  Follow Up Time:

## 2022-06-29 NOTE — CONSULT LETTER
[Dear  ___] : Dear  [unfilled], [Consult Letter:] : I had the pleasure of evaluating your patient, [unfilled]. [Please see my note below.] : Please see my note below. [FreeTextEntry3] : Sincerely,\par \par Ramonita Cage MD\par [facility]\par Pulmonary Medicine\par tel: [phone]\par fax: [phone]\par

## 2022-06-29 NOTE — ED PROVIDER NOTE - CHPI ED SYMPTOMS NEG
no body aches/no chest pain/no cough/no edema/no fever/no headache/no hemoptysis/no wheezing/no diaphoresis

## 2022-06-29 NOTE — ASSESSMENT
[FreeTextEntry1] : Ms. CARROLL MONTOYA is a 46 year old woman with psoriatic arthritis (on Humira), lymphocytic colitis and COVID 5/2022 here for evaluation. She has pleuritic chest pain, IBANEZ after waling about 100 feet, HR increased from 106-129. Os sats remain 97-98% on RA. \par I am concerned about PE. Patient will go to ER for emergent evaluation. EMS called.

## 2022-06-29 NOTE — ED PROVIDER NOTE - NSFOLLOWUPINSTRUCTIONS_ED_ALL_ED_FT
1) Follow-up with your Primary Medical Doctor for prompt follow-up.  2) Return to Emergency room for any worsening or persistent pain, weakness, fever, increased or persistent shortness of breath, chest pains, passing out,  or any other concerning symptoms.  3) See attached instruction sheets for additional information, including information regarding signs and symptoms to look out for, reasons to seek immediate care and other important instructions.  4) Follow-up with your Pulmonologist as discussed

## 2022-06-29 NOTE — ED PROVIDER NOTE - PATIENT PORTAL LINK FT
You can access the FollowMyHealth Patient Portal offered by [facility] by registering at the following website: [email or web address]. By joining [employer]’s FollowMyHealth portal, you will also be able to view your health information using other applications (apps) compatible with our system.

## 2022-06-29 NOTE — ED PROVIDER NOTE - PROGRESS NOTE DETAILS
Reevaluated patient at bedside.  Patient feeling well, no acute changes.  Discussed the results of all diagnostic testing in ED , including CTA with suboptimal bolus timing and copies of all reports given.   An opportunity to ask questions was given.  Discussed the importance of prompt, close medical follow-up.  Patient will return with any changes, concerns or persistent / worsening symptoms.  Understanding of all instructions verbalized.

## 2022-06-30 ENCOUNTER — TRANSCRIPTION ENCOUNTER (OUTPATIENT)
Age: 47
End: 2022-06-30

## 2022-07-01 ENCOUNTER — APPOINTMENT (OUTPATIENT)
Dept: PULMONOLOGY | Facility: CLINIC | Age: 47
End: 2022-07-01

## 2022-07-01 ENCOUNTER — NON-APPOINTMENT (OUTPATIENT)
Age: 47
End: 2022-07-01

## 2022-07-01 ENCOUNTER — APPOINTMENT (OUTPATIENT)
Dept: CARDIOLOGY | Facility: CLINIC | Age: 47
End: 2022-07-01

## 2022-07-01 VITALS
DIASTOLIC BLOOD PRESSURE: 87 MMHG | BODY MASS INDEX: 34.66 KG/M2 | TEMPERATURE: 97.4 F | HEART RATE: 96 BPM | SYSTOLIC BLOOD PRESSURE: 126 MMHG | WEIGHT: 208 LBS | HEIGHT: 65 IN | OXYGEN SATURATION: 99 %

## 2022-07-01 DIAGNOSIS — R06.00 DYSPNEA, UNSPECIFIED: ICD-10-CM

## 2022-07-01 DIAGNOSIS — R42 DIZZINESS AND GIDDINESS: ICD-10-CM

## 2022-07-01 PROBLEM — K21.9 GASTRO-ESOPHAGEAL REFLUX DISEASE WITHOUT ESOPHAGITIS: Chronic | Status: ACTIVE | Noted: 2022-06-29

## 2022-07-01 PROBLEM — E03.9 HYPOTHYROIDISM, UNSPECIFIED: Chronic | Status: ACTIVE | Noted: 2022-06-29

## 2022-07-01 PROBLEM — L40.9 PSORIASIS, UNSPECIFIED: Chronic | Status: ACTIVE | Noted: 2022-06-29

## 2022-07-01 PROBLEM — G43.909 MIGRAINE, UNSPECIFIED, NOT INTRACTABLE, WITHOUT STATUS MIGRAINOSUS: Chronic | Status: ACTIVE | Noted: 2022-06-29

## 2022-07-01 PROBLEM — I10 ESSENTIAL (PRIMARY) HYPERTENSION: Chronic | Status: ACTIVE | Noted: 2022-06-29

## 2022-07-01 PROBLEM — L40.50 ARTHROPATHIC PSORIASIS, UNSPECIFIED: Chronic | Status: ACTIVE | Noted: 2022-06-29

## 2022-07-01 PROCEDURE — 94727 GAS DIL/WSHOT DETER LNG VOL: CPT

## 2022-07-01 PROCEDURE — 94010 BREATHING CAPACITY TEST: CPT

## 2022-07-01 PROCEDURE — 93000 ELECTROCARDIOGRAM COMPLETE: CPT

## 2022-07-01 PROCEDURE — 99204 OFFICE O/P NEW MOD 45 MIN: CPT

## 2022-07-01 PROCEDURE — 94729 DIFFUSING CAPACITY: CPT

## 2022-07-01 PROCEDURE — 93306 TTE W/DOPPLER COMPLETE: CPT

## 2022-07-01 NOTE — REVIEW OF SYSTEMS
[Feeling Fatigued] : feeling fatigued [SOB] : shortness of breath [Dyspnea on exertion] : dyspnea during exertion [Chest Discomfort] : chest discomfort [Lower Ext Edema] : lower extremity edema [Palpitations] : palpitations [Dizziness] : dizziness [Joint Pain] : joint pain [Rash] : rash [Negative] : Heme/Lymph [Orthopnea] : no orthopnea [PND] : no PND [Syncope] : no syncope

## 2022-07-01 NOTE — DISCUSSION/SUMMARY
[EKG obtained to assist in diagnosis and management of assessed problem(s)] : EKG obtained to assist in diagnosis and management of assessed problem(s) [FreeTextEntry1] : PASC/long covid\par POTS on exam and by history\par TTE wnl\par \par -we discussed the natural hx of long-COVID\par -reviewed mostly supportive care with lifestyle changes - including 4-6L fluid intake daily, trying to increase sodium intake if BP does not significantly increase, compression garments, small meals, low carb meals, graduated upright exercise. will consider addition of metoprolol if there remains dizziness and tachycardia.\par -check treadmill stress test to evaluate exercise tolerance and HR response. will decide further follow-up based upon results of testing

## 2022-07-01 NOTE — CARDIOLOGY SUMMARY
[de-identified] : 7/1/22: ST 100bpm [de-identified] : 7/1/22: \par EF 55%, E/A reversal, normal valvular function

## 2022-07-01 NOTE — HISTORY OF PRESENT ILLNESS
[FreeTextEntry1] : PCP: Dr. Abbey Resendiz\par \par 46F HTN, Grave's s/p MOLINA -hypothyroidism, psoriatic arthritis on Humira, migraines who presents for evaluation of post acute sequelae of COVID-19\par \par \par COVID-19 most recently in May 2022, since then with\par \par SOB, dizziness, HR up to 130 with minimal exertion without palpitations\par fatigued all the time with significantly reduced ET\par PFT's without significant findings\par also noted to have mild LE edema\par brief palpitations without visual disturbances or dizziness associated\par \par \par 96bpm lying down 126/86\par 132bpm standing   122/82 at ten minutes\par \par Fam hx:\par No CAD or SCD\par \par Pregnancy hx:\par Twice\par PEC with first\par

## 2022-07-07 ENCOUNTER — TRANSCRIPTION ENCOUNTER (OUTPATIENT)
Age: 47
End: 2022-07-07

## 2022-07-08 ENCOUNTER — TRANSCRIPTION ENCOUNTER (OUTPATIENT)
Age: 47
End: 2022-07-08

## 2022-07-11 ENCOUNTER — TRANSCRIPTION ENCOUNTER (OUTPATIENT)
Age: 47
End: 2022-07-11

## 2022-07-28 ENCOUNTER — APPOINTMENT (OUTPATIENT)
Dept: CARDIOLOGY | Facility: CLINIC | Age: 47
End: 2022-07-28

## 2022-07-28 PROCEDURE — 93015 CV STRESS TEST SUPVJ I&R: CPT

## 2022-07-28 PROCEDURE — 99213 OFFICE O/P EST LOW 20 MIN: CPT | Mod: 25

## 2022-08-09 ENCOUNTER — TRANSCRIPTION ENCOUNTER (OUTPATIENT)
Age: 47
End: 2022-08-09

## 2022-09-06 ENCOUNTER — RX RENEWAL (OUTPATIENT)
Age: 47
End: 2022-09-06

## 2022-09-21 ENCOUNTER — APPOINTMENT (OUTPATIENT)
Dept: RHEUMATOLOGY | Facility: CLINIC | Age: 47
End: 2022-09-21

## 2022-09-21 VITALS
HEIGHT: 65 IN | BODY MASS INDEX: 34.66 KG/M2 | DIASTOLIC BLOOD PRESSURE: 86 MMHG | WEIGHT: 208 LBS | SYSTOLIC BLOOD PRESSURE: 143 MMHG | OXYGEN SATURATION: 99 % | HEART RATE: 101 BPM

## 2022-09-21 PROCEDURE — 99215 OFFICE O/P EST HI 40 MIN: CPT | Mod: 25

## 2022-09-21 PROCEDURE — 36415 COLL VENOUS BLD VENIPUNCTURE: CPT

## 2022-09-25 ENCOUNTER — TRANSCRIPTION ENCOUNTER (OUTPATIENT)
Age: 47
End: 2022-09-25

## 2022-09-25 LAB
ALBUMIN SERPL ELPH-MCNC: 4.5 G/DL
ALP BLD-CCNC: 63 U/L
ALT SERPL-CCNC: 22 U/L
ANION GAP SERPL CALC-SCNC: 14 MMOL/L
AST SERPL-CCNC: 18 U/L
BASOPHILS # BLD AUTO: 0.04 K/UL
BASOPHILS NFR BLD AUTO: 0.7 %
BILIRUB SERPL-MCNC: 0.3 MG/DL
BUN SERPL-MCNC: 10 MG/DL
CALCIUM SERPL-MCNC: 9.3 MG/DL
CHLORIDE SERPL-SCNC: 103 MMOL/L
CO2 SERPL-SCNC: 25 MMOL/L
CREAT SERPL-MCNC: 0.81 MG/DL
CRP SERPL-MCNC: <3 MG/L
EGFR: 92 ML/MIN/1.73M2
EOSINOPHIL # BLD AUTO: 0.48 K/UL
EOSINOPHIL NFR BLD AUTO: 8 %
ERYTHROCYTE [SEDIMENTATION RATE] IN BLOOD BY WESTERGREN METHOD: 3 MM/HR
HCT VFR BLD CALC: 44.1 %
HGB BLD-MCNC: 13.6 G/DL
IMM GRANULOCYTES NFR BLD AUTO: 0.2 %
LYMPHOCYTES # BLD AUTO: 1.58 K/UL
LYMPHOCYTES NFR BLD AUTO: 26.3 %
MAN DIFF?: NORMAL
MCHC RBC-ENTMCNC: 28.6 PG
MCHC RBC-ENTMCNC: 30.8 GM/DL
MCV RBC AUTO: 92.6 FL
MONOCYTES # BLD AUTO: 0.48 K/UL
MONOCYTES NFR BLD AUTO: 8 %
NEUTROPHILS # BLD AUTO: 3.41 K/UL
NEUTROPHILS NFR BLD AUTO: 56.8 %
PLATELET # BLD AUTO: 390 K/UL
POTASSIUM SERPL-SCNC: 4.6 MMOL/L
PROT SERPL-MCNC: 6.7 G/DL
RBC # BLD: 4.76 M/UL
RBC # FLD: 13.2 %
SODIUM SERPL-SCNC: 143 MMOL/L
TSH SERPL-ACNC: 0.33 UIU/ML
WBC # FLD AUTO: 6 K/UL

## 2022-09-25 NOTE — HISTORY OF PRESENT ILLNESS
[FreeTextEntry1] : INTERVAL HX \par has had a very difficulty few months.  \par in terms of new issues:  dx with long covid and POTS now.   s/p covid natural infection.  developed subsequent dyspnea in july 2022.   CTA negative for pe.  PFT - not lungs however PCP and pulmonary didn’t like the way she was breathing - was sent to cardio.  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 - are PASH - overgrowth - was at [facility] - breast surgeon - is watching it - - was rec to have breast reduction \par \par for the new POTS and long covid has now started new treatments:\par - metoprolol - now on 50 mg \par - b12, [place] and alpha lipoic acid \par - coq10 \par \par in terms of PsA - this has been okay [Anorexia] : no anorexia [Weight Loss] : no weight loss [Malaise] : no malaise [Fever] : no fever [Depression] : no depression [Skin Lesions] : no lesions [Oral Ulcers] : no oral ulcers [Cough] : no cough [Dry Mouth] : no dry mouth [Dysphonia] : no dysphonia [Dysphagia] : no dysphagia [Shortness of Breath] : no shortness of breath [Joint Swelling] : no joint swelling [Joint Warmth] : no joint warmth [Joint Deformity] : no joint deformity [Decreased ROM] : no decreased range of motion [Morning Stiffness] : no morning stiffness [Difficulty Standing] : no difficulty standing [Myalgias] : no myalgias [Visual Changes] : no visual changes [Eye Redness] : no eye redness [Dry Eyes] : no dry eyes

## 2022-09-25 NOTE — ASSESSMENT
[FreeTextEntry1] : 46 yo woman PMHX psoriasis, htn, graves, gerd, preeclampsia here with polyarthralgia\par \par here for chronic and acute issues\par \par #LOng COVID and POTS\par (2022) discussed with patient at length \par -- support given\par -- treatment at this time will not interfere with her Ps) treatments\par -- reviewed pulmonary and cardio notes\par \par # PsO /  PsA.  \par HLAB27 neg.  Previous treatment included skyrizi ([id number]) - effective only for skin, otezla (diarrhea), ? cosentyx vs stelara in the past.  started humira (2021 - present).  \par -- check inflammatory markers\par -- continue Humira\par \par #BMI 35\par d/w patient that weight loss may actually be beneficial for her \par -- encouraged continued weight loss \par -- t/c low gluten diet  and high fiber fruits instead if okay by IBS\par \par #migraines \par possibly related to hypertension ? related to nsaid use - was using two prior to this \par -- d/w patient NSAID - patient aware \par -- working with neuro as well\par \par # Graves then hypothyroidism \par currently being regulated \par -- check TSh as hasn’t had this checked in a while\par \par #low vit d\par - supplement increased  and will \par \par #medication monitoring \par -- quant tb negative May 2022\par -- hepatitis negative May 2022\par -- now on NSAID and +h/o gerd take with food and  on omeprazole\par -- check labs\par \par #vaccine counseling\par s/p vaccine x 2 shots.   applied for booster\par -- Patient advised that vaccine effectiveness may be blunted by some rheum meds.\par -- to f/u with covid clinic docs dr boucher\par \par \par More than 50% of the encounter was spent counseling the patient on differential, workup, disease course and treatment/management.  Education was provided to the patient during this encounter.  All questions and concerns were addressed and answered.   The patient verbalized understanding and agreed to the plan. \par \par Patient has been instructed to call for an appointment if new symptoms develop.\par Patient has been instructed to make a followup appointment in 3 months.\par \par \par

## 2022-09-26 ENCOUNTER — TRANSCRIPTION ENCOUNTER (OUTPATIENT)
Age: 47
End: 2022-09-26

## 2022-10-03 ENCOUNTER — APPOINTMENT (OUTPATIENT)
Dept: CARDIOLOGY | Facility: CLINIC | Age: 47
End: 2022-10-03

## 2022-10-03 VITALS
TEMPERATURE: 98.2 F | OXYGEN SATURATION: 99 % | BODY MASS INDEX: 33.99 KG/M2 | HEIGHT: 65 IN | DIASTOLIC BLOOD PRESSURE: 79 MMHG | HEART RATE: 85 BPM | SYSTOLIC BLOOD PRESSURE: 118 MMHG | WEIGHT: 204 LBS

## 2022-10-03 LAB
ADALIMUMAB AB SERPL-MCNC: <25 NG/ML
ADALIMUMAB SERPL-MCNC: 6.7 UG/ML

## 2022-10-03 PROCEDURE — 99214 OFFICE O/P EST MOD 30 MIN: CPT

## 2022-10-03 RX ORDER — UBIDECARENONE 200 MG
200 TABLET ORAL
Refills: 0 | Status: ACTIVE | COMMUNITY

## 2022-10-03 NOTE — DISCUSSION/SUMMARY
[FreeTextEntry1] : PASC/long covid\par POTS on exam and by history\par TTE wnl\par \par Overall slowly improving with conservative measures/lifestyle changes though still with some significant residual symptoms.\par \par Can consider the bivalent booster though there have been mixed results with pts with preexisting long COVID. \par \par From a cardiac standpoint she appears to be well enough to at least start part time work again with reasonable accommodation such as time to rest if she feels a bout of fatigue and avoiding physically strenuous work. Will check another treadmill stress test to assess ET, HR/BP response to exercise.

## 2022-10-03 NOTE — REVIEW OF SYSTEMS
[Feeling Fatigued] : feeling fatigued [SOB] : shortness of breath [Dyspnea on exertion] : dyspnea during exertion [Palpitations] : palpitations [Joint Pain] : joint pain [Rash] : rash [Dizziness] : dizziness [Negative] : Heme/Lymph [Chest Discomfort] : no chest discomfort [Lower Ext Edema] : no extremity edema [Orthopnea] : no orthopnea [PND] : no PND [Syncope] : no syncope

## 2022-10-03 NOTE — HISTORY OF PRESENT ILLNESS
[FreeTextEntry1] : PCP: Dr. Abbey Resendiz\par \par 46F HTN, Grave's s/p MOLINA -hypothyroidism, psoriatic arthritis on Humira, migraines, POTS, who presents for follow-up of post acute sequelae of COVID-19\par \par COVID-19 most recently in May 2022, since then dx with POTS as well as mild postexertional fatigue\par HR appears to be improved with time and with metoprolol but still with fatigue\par Is also undergoing neurological eval and found to have some cognitive dysfunction\par \par Overall is slowly improving. Anxious about returning to work full time.\par \par \par Previously:\par 96bpm lying down 126/86\par 132bpm standing   122/82 at ten minutes\par \par Fam hx:\par No CAD or SCD\par \par Pregnancy hx:\par Twice\par PEC with first\par \par metoprolol s helping\par

## 2022-10-03 NOTE — CARDIOLOGY SUMMARY
[de-identified] : 7/1/22: ST 100bpm [de-identified] : 7/28/22: Charlie 6:16, appropriate HR/BP response on metoprolol [de-identified] : 7/1/22: \par EF 55%, E/A reversal, normal valvular function

## 2022-10-05 ENCOUNTER — TRANSCRIPTION ENCOUNTER (OUTPATIENT)
Age: 47
End: 2022-10-05

## 2022-10-06 ENCOUNTER — APPOINTMENT (OUTPATIENT)
Dept: ORTHOPEDIC SURGERY | Facility: CLINIC | Age: 47
End: 2022-10-06

## 2022-10-06 PROCEDURE — 73010 X-RAY EXAM OF SHOULDER BLADE: CPT | Mod: RT

## 2022-10-06 PROCEDURE — J3490M: CUSTOM

## 2022-10-06 PROCEDURE — 20611 DRAIN/INJ JOINT/BURSA W/US: CPT | Mod: RT

## 2022-10-06 PROCEDURE — 73030 X-RAY EXAM OF SHOULDER: CPT | Mod: RT

## 2022-10-06 PROCEDURE — 99214 OFFICE O/P EST MOD 30 MIN: CPT | Mod: 25

## 2022-10-06 PROCEDURE — 76881 US COMPL JOINT R-T W/IMG: CPT | Mod: 59,RT

## 2022-10-06 NOTE — DISCUSSION/SUMMARY
[de-identified] : 46f with right shoulder bursitis, biceps tendinitis\par 1) csi right shoulder today - tolerated well\par 2) cryotherapy, rest and activity modification\par 3) hep \par \par Entered by Delicia Morales acting as scribe.\par \par \par

## 2022-10-06 NOTE — PROCEDURE
[FreeTextEntry3] :  Large joint injection was performed of the right shoulder. An injection of Lidocaine 3cc of 1% , Bupivacaine (Marcaine) 6cc of 0.5% , Triamcinolone (Kenalog) 2cc of 40 mg was used. Patient was advised to call if redness, pain or fever occur and apply ice for 15 minutes out of every hour for the next 12-24 hours as tolerated.\par \par Patient has tried OTC's including aspirin, Ibuprofen, Aleve, etc or prescription NSAIDS, and/or exercises at home and/or physical therapy without satisfactory response, patient had decreased mobility in the joint and the risks benefits, and alternatives have been discussed, and verbal consent was obtained.\par \par The site was prepped with alcohol, betadine and ethyl chloride sprayed topically\par \par The risks, benefits and contents of the injection have been discussed. Risks include but are not limited to allergic reaction, flare reaction, permanent white skin discoloration at the injection site and infection. The patient understands the risks and agrees to having the injection. All questions have been answered.\par \par Ultrasound guidance was indicated for this patient due to prior failure or difficult injection. All ultrasound images have been permanently captured and stored accordingly in our picture archiving and communication system. Visualization of the needle and placement of injection was performed without complication.\par An ultrasound of the extremity was indicated due to evaluate tendon for tears, tendonitis, soft tissue mass, or ganglion cyst. The findings showed no evidence of ligament, tendon or muscle tear.\par

## 2022-10-06 NOTE — PHYSICAL EXAM
[5 ___] : forward flexion 5[unfilled]/5 [5___] : abduction 5[unfilled]/5 [Right] : right shoulder [There are no fractures, subluxations or dislocations. No significant abnormalities are seen] : There are no fractures, subluxations or dislocations. No significant abnormalities are seen [] : no atrophy [TWNoteComboBox7] : active forward flexion 100 degrees [de-identified] : active abduction 130 degrees [TWNoteComboBox6] : internal rotation lateral hip

## 2022-10-06 NOTE — HISTORY OF PRESENT ILLNESS
[Sudden] : sudden [8] : 8 [5] : 5 [Dull/Aching] : dull/aching [Constant] : constant [de-identified] : 10/06/2022 Ms. CARROLL IRIZARRYCUAL,  46 year old  female , presents today for right shoulder x1 week, started when lifting a heavy laundry basket. [] : no [FreeTextEntry5] : pt injured the shoulder last week lifting a heavy basket. no prior history of shoulder pain [FreeTextEntry7] : down to the elbow

## 2022-10-12 ENCOUNTER — TRANSCRIPTION ENCOUNTER (OUTPATIENT)
Age: 47
End: 2022-10-12

## 2022-10-13 ENCOUNTER — APPOINTMENT (OUTPATIENT)
Dept: CARDIOLOGY | Facility: CLINIC | Age: 47
End: 2022-10-13

## 2022-10-13 PROCEDURE — 93015 CV STRESS TEST SUPVJ I&R: CPT

## 2022-10-13 PROCEDURE — ZZZZZ: CPT

## 2022-10-20 ENCOUNTER — TRANSCRIPTION ENCOUNTER (OUTPATIENT)
Age: 47
End: 2022-10-20

## 2022-11-21 ENCOUNTER — FORM ENCOUNTER (OUTPATIENT)
Age: 47
End: 2022-11-21

## 2022-11-22 ENCOUNTER — APPOINTMENT (OUTPATIENT)
Dept: MRI IMAGING | Facility: CLINIC | Age: 47
End: 2022-11-22

## 2022-11-22 PROCEDURE — 73221 MRI JOINT UPR EXTREM W/O DYE: CPT | Mod: RT

## 2022-11-29 ENCOUNTER — NON-APPOINTMENT (OUTPATIENT)
Age: 47
End: 2022-11-29

## 2022-11-29 DIAGNOSIS — M75.51 BURSITIS OF RIGHT SHOULDER: ICD-10-CM

## 2022-11-29 DIAGNOSIS — M75.21 BICIPITAL TENDINITIS, RIGHT SHOULDER: ICD-10-CM

## 2022-12-12 ENCOUNTER — TRANSCRIPTION ENCOUNTER (OUTPATIENT)
Age: 47
End: 2022-12-12

## 2022-12-13 ENCOUNTER — TRANSCRIPTION ENCOUNTER (OUTPATIENT)
Age: 47
End: 2022-12-13

## 2022-12-14 ENCOUNTER — TRANSCRIPTION ENCOUNTER (OUTPATIENT)
Age: 47
End: 2022-12-14

## 2022-12-15 ENCOUNTER — TRANSCRIPTION ENCOUNTER (OUTPATIENT)
Age: 47
End: 2022-12-15

## 2022-12-16 ENCOUNTER — APPOINTMENT (OUTPATIENT)
Dept: RHEUMATOLOGY | Facility: CLINIC | Age: 47
End: 2022-12-16

## 2022-12-16 VITALS
WEIGHT: 202 LBS | BODY MASS INDEX: 33.61 KG/M2 | OXYGEN SATURATION: 100 % | SYSTOLIC BLOOD PRESSURE: 131 MMHG | HEART RATE: 95 BPM | DIASTOLIC BLOOD PRESSURE: 78 MMHG

## 2022-12-16 DIAGNOSIS — M79.676 PAIN IN UNSPECIFIED TOE(S): ICD-10-CM

## 2022-12-16 DIAGNOSIS — R20.2 PARESTHESIA OF SKIN: ICD-10-CM

## 2022-12-16 PROCEDURE — 36415 COLL VENOUS BLD VENIPUNCTURE: CPT

## 2022-12-16 PROCEDURE — 99214 OFFICE O/P EST MOD 30 MIN: CPT | Mod: 25

## 2022-12-16 NOTE — HISTORY OF PRESENT ILLNESS
[FreeTextEntry1] : 44 yo PMHX psoriasis, graves, htn, anxiety here for evaluation of polyarthralgias\par \par PsA.  HLAB27 neg \par - skyrizi ([id number]) - effective only for skin, \par - otezla (diarrhea), \par - ? cosentyx vs stelara in the past\par -  humira (2021 - present).  very effective with reduction in pain and swelling of the otes and hands.   now without pain in the toes and the past this was source of great concern.   \par \par INTERVAL Hx\par here for urgent visit - and will do the f/u in addition to the acute issue \par \par in terms of the new issue.  developed toe pain - was quite severe otw.  noted that the night previous to it she had a beer at a party.  she developed extreme pain but not at the joint - at the tip of the toe - it felt like razor and electricity that was shooting out the end of the toe.  the joint itself was okay.  it was sensitivty to touch and did have diffciutly with baring weigh ton it.  there was no swelling or heat or redness this time (has had this before and she didn’t have it then) - it has slowly improved but is still not right.  \par - denies trauma \par - didn’t wear heels excessivtly long \par - other joitns are fine \par \par in terms of the psA .  doing well on the humira.   tolerants, adherent, no dactylitis \par \par in terms of the post covid and pots.  on the metoprolol   was better for a bit.  now has significant wide fluctuations in heart rate and fatigue again. \par  [Anorexia] : no anorexia [Weight Loss] : no weight loss [Malaise] : no malaise [Fever] : no fever [Depression] : no depression [Skin Lesions] : no lesions [Oral Ulcers] : no oral ulcers [Cough] : no cough [Dry Mouth] : no dry mouth [Dysphonia] : no dysphonia [Dysphagia] : no dysphagia [Shortness of Breath] : no shortness of breath [Joint Swelling] : no joint swelling [Joint Warmth] : no joint warmth [Joint Deformity] : no joint deformity [Decreased ROM] : no decreased range of motion [Morning Stiffness] : no morning stiffness [Difficulty Standing] : no difficulty standing [Myalgias] : no myalgias [Visual Changes] : no visual changes [Eye Redness] : no eye redness [Dry Eyes] : no dry eyes

## 2022-12-16 NOTE — PHYSICAL EXAM
[General Appearance - Alert] : alert [General Appearance - In No Acute Distress] : in no acute distress [General Appearance - Well Nourished] : well nourished [General Appearance - Well Developed] : well developed [General Appearance - Well-Appearing] : healthy appearing [Sclera] : the sclera and conjunctiva were normal [Edema] : there was no peripheral edema [Veins - Varicosity Changes] : there were no varicosital changes [No CVA Tenderness] : no ~M costovertebral angle tenderness [No Spinal Tenderness] : no spinal tenderness [Abnormal Walk] : normal gait [Nail Clubbing] : no clubbing  or cyanosis of the fingernails [Musculoskeletal - Swelling] : no joint swelling seen [Motor Tone] : muscle strength and tone were normal [Skin Color & Pigmentation] : normal skin color and pigmentation [Skin Turgor] : normal skin turgor [] : no rash [Oriented To Time, Place, And Person] : oriented to person, place, and time [Impaired Insight] : insight and judgment were intact [Affect] : the affect was normal [FreeTextEntry1] : no synovitis apparent -no dacytlitis - toe not warm, red, hot or swollen - able to bend at joints without pain.  NTTP

## 2022-12-16 NOTE — CONSULT LETTER
[Dear  ___] : Dear  [unfilled], [Courtesy Letter:] : I had the pleasure of seeing your patient, [unfilled], in my office today. [Please see my note below.] : Please see my note below. [Sincerely,] : Sincerely, [FreeTextEntry2] : Raymundo Vargas

## 2022-12-16 NOTE — ASSESSMENT
[FreeTextEntry1] : 46 yo woman PMHX psoriasis, htn, graves, gerd, preeclampsia here with polyarthralgia\par \par here for chronic and acute issues\par \par #toe pain \par acute toe pain - but w/o s/s of inflammatory arthritis.  describes pain as electric shocks coming out the end - which makes it more concerning fro a neruopathy\par -- as improving - monitori\par -- marcie cehck sUA - doubt gout \par -- nsaid prn pain \par -- topical lido for the electric sensation \par -- t/c neuro / foot evaluation - ? TTS - t/c NCS\par \par #LOng COVID and POTS\par 2022 now on metoprolol\par -- r/u cardio\par \par # PsO /  PsA.  \par HLAB27 neg.  Previous treatment included skyrizi ([id number]) - effective only for skin, otezla (diarrhea), ? cosentyx vs stelara in the past.  started humira (2021 - present).  \par -- check inflammatory markers\par -- continue Humira\par \par #BMI 35\par -- encouraged continued weight loss \par -- t/c low gluten diet  and high fiber fruits instead if okay by IBS\par \par #migraines \par possibly related to hypertension ? related to nsaid use - was using two prior to this \par -- working with neuro as well\par \par # Graves then hypothyroidism \par currently being regulated \par -- check TSh as hasn’t had this checked in a while\par \par #low vit d\par - supplement increased  and will \par \par #medication monitoring \par -- quant tb negative May 2022\par -- hepatitis negative May 2022\par -- now on NSAID and +h/o gerd take with food and  on omeprazole\par -- check labs\par \par #vaccine counseling\par s/p vaccine x 2 shots.   applied for booster\par -- Patient advised that vaccine effectiveness may be blunted by some rheum meds.\par -- to f/u with covid clinic docs dr boucher\par \par \par More than 50% of the encounter was spent counseling the patient on differential, workup, disease course and treatment/management.  Education was provided to the patient during this encounter.  All questions and concerns were addressed and answered.   The patient verbalized understanding and agreed to the plan. \par \par Patient has been instructed to call for an appointment if new symptoms develop.\par Patient has been instructed to make a followup appointment in 3 months.\par \par Time spent on the encounter included, but is not limited to, preparing to see the patient, obtaining and/or reviewing separately obtained history, performing the evaluation, counseling and educating, independently interpreting results with communication to patient, order placement, referring and/or communicating with other health professionals as described, and documenting clinical information in the electronic health record\par \par

## 2022-12-20 ENCOUNTER — APPOINTMENT (OUTPATIENT)
Dept: CARDIOLOGY | Facility: CLINIC | Age: 47
End: 2022-12-20

## 2022-12-20 ENCOUNTER — NON-APPOINTMENT (OUTPATIENT)
Age: 47
End: 2022-12-20

## 2022-12-20 VITALS
WEIGHT: 202 LBS | DIASTOLIC BLOOD PRESSURE: 79 MMHG | HEART RATE: 87 BPM | TEMPERATURE: 98.3 F | HEIGHT: 65 IN | OXYGEN SATURATION: 9 % | SYSTOLIC BLOOD PRESSURE: 117 MMHG | BODY MASS INDEX: 33.66 KG/M2

## 2022-12-20 DIAGNOSIS — G90.A POSTURAL ORTHOSTATIC TACHYCARDIA SYNDROME [POTS]: ICD-10-CM

## 2022-12-20 LAB
ALBUMIN SERPL ELPH-MCNC: 4.4 G/DL
ALP BLD-CCNC: 52 U/L
ALT SERPL-CCNC: 22 U/L
ANION GAP SERPL CALC-SCNC: 9 MMOL/L
AST SERPL-CCNC: 19 U/L
BASOPHILS # BLD AUTO: 0.06 K/UL
BASOPHILS NFR BLD AUTO: 0.9 %
BILIRUB SERPL-MCNC: 0.5 MG/DL
BUN SERPL-MCNC: 13 MG/DL
CALCIUM SERPL-MCNC: 9.5 MG/DL
CHLORIDE SERPL-SCNC: 100 MMOL/L
CO2 SERPL-SCNC: 26 MMOL/L
CREAT SERPL-MCNC: 0.82 MG/DL
CRP SERPL-MCNC: <3 MG/L
EGFR: 90 ML/MIN/1.73M2
EOSINOPHIL # BLD AUTO: 0.48 K/UL
EOSINOPHIL NFR BLD AUTO: 6.8 %
ERYTHROCYTE [SEDIMENTATION RATE] IN BLOOD BY WESTERGREN METHOD: 4 MM/HR
HCT VFR BLD CALC: 42.4 %
HGB BLD-MCNC: 13.2 G/DL
IMM GRANULOCYTES NFR BLD AUTO: 0.1 %
LYMPHOCYTES # BLD AUTO: 1.58 K/UL
LYMPHOCYTES NFR BLD AUTO: 22.4 %
MAN DIFF?: NORMAL
MCHC RBC-ENTMCNC: 28.8 PG
MCHC RBC-ENTMCNC: 31.1 GM/DL
MCV RBC AUTO: 92.6 FL
MONOCYTES # BLD AUTO: 0.5 K/UL
MONOCYTES NFR BLD AUTO: 7.1 %
NEUTROPHILS # BLD AUTO: 4.41 K/UL
NEUTROPHILS NFR BLD AUTO: 62.7 %
PLATELET # BLD AUTO: 360 K/UL
POTASSIUM SERPL-SCNC: 4.8 MMOL/L
PROT SERPL-MCNC: 6.6 G/DL
RBC # BLD: 4.58 M/UL
RBC # FLD: 13.9 %
SODIUM SERPL-SCNC: 136 MMOL/L
URATE SERPL-MCNC: 3.9 MG/DL
VIT B12 SERPL-MCNC: 1442 PG/ML
WBC # FLD AUTO: 7.04 K/UL

## 2022-12-20 PROCEDURE — 99214 OFFICE O/P EST MOD 30 MIN: CPT | Mod: 25

## 2022-12-20 PROCEDURE — 93000 ELECTROCARDIOGRAM COMPLETE: CPT

## 2022-12-20 NOTE — DISCUSSION/SUMMARY
Occupational Therapy  Facility/Department: [facility]/VAS/MED  Daily Treatment Note  NAME: Jose Maria Yuan  : 1937  MRN: [id number]    Date of Service: 2021    Discharge Recommendations:          Assessment   Performance deficits / Impairments: Decreased functional mobility ; Decreased endurance; Decreased cognition; Decreased safe awareness; Decreased strength; Decreased ADL status; Decreased balance  Assessment: Pt willing to sit up in chair. Pt stood at bedside with RW and stepped over to recliner with mod assist x2. Pt moves very slow and guarded and has a posterior lean making pt unsafe for transfers without assist.  Pt continues to benefit from skilled OT services. Treatment Diagnosis: Syncope  Prognosis: Fair  REQUIRES OT FOLLOW UP: Yes  Activity Tolerance  Activity Tolerance: Patient limited by fatigue         Patient Diagnosis(es): The primary encounter diagnosis was Positive blood culture. Diagnoses of Delirium superimposed on dementia and Renal insufficiency were also pertinent to this visit. has a past medical history of Arthritis, Bladder infection, CAD (coronary artery disease), Cardiovascular disease, Chronic kidney disease, Colon polyps, Constipation, Gallbladder disease, GERD (gastroesophageal reflux disease), Gout, Heart attack ([address] Heart disease, Hyperlipidemia, Hypertension, Incontinence, and Thyroid disease. has a past surgical history that includes Cholecystectomy; Hysterectomy; and Coronary artery bypass graft.     Restrictions  Restrictions/Precautions  Restrictions/Precautions: Fall Risk  Subjective   General  Chart Reviewed: Yes  Patient assessed for rehabilitation services?: Yes  Response to previous treatment: Patient with no complaints from previous session  Family / Caregiver Present: Yes (Daughter)  Diagnosis: AMS  Pain Assessment  Pain Assessment: 0-10  Pain Level: 5  Pain Type: Chronic pain  Pain Location: Back  Pain Descriptors: Aching; Discomfort  Pain Frequency: Intermittent  Pain Onset: On-going  Clinical Progression: Not changed  Functional Pain Assessment: Prevents or interferes some active activities and ADLs  Non-Pharmaceutical Pain Intervention(s): Repositioned; Therapeutic presence; Therapeutic touch  Response to Pain Intervention: Patient Satisfied  Vital Signs  Patient Currently in Pain: Yes   Orientation     Objective             Balance  Sitting Balance: Stand by assistance  Standing Balance: Moderate assistance     Transfers  Stand Step Transfers: Moderate assistance; 2 Person assistance  Sit to stand:  Moderate assistance; 2 Person assistance  Stand to sit: Moderate assistance; 2 Person assistance                                                           Plan   Plan  Times per week: 3-5x/week  Times per day: Daily  Goals  Short term goals  Time Frame for Short term goals: 1 week  Short term goal 1: CGA with toilet tfers  Short term goal 2: Yessica with seated LB dsg  Short term goal 3: Yessica with clothing mgmt in standing  Long term goals  Long term goal 1: Return to PLOF       Therapy Time   Individual Concurrent Group Co-treatment   Time In           Time Out           Minutes              PAIGE Pina  Electronically signed by PAIGE Pina on 11/22/2021 at 11:58 AM [EKG obtained to assist in diagnosis and management of assessed problem(s)] : EKG obtained to assist in diagnosis and management of assessed problem(s) [FreeTextEntry1] : PASC/long covid\par hx of POTS\par TTE wnl\par \par ?if her new symptoms are an exacerbation of her prior symptoms or if they are betablocker side effect\par \par will trial decreasing metoprolol to 25mg daily. if no significant rebound, will try weaning off over the next 4-7 days.

## 2022-12-20 NOTE — CARDIOLOGY SUMMARY
[de-identified] : 12/20/22: SR, poor R wave progression\par 7/1/22: ST 100bpm [de-identified] : 7/28/22: Charlie 6:16, appropriate HR/BP response on metoprolol\par 10/13/22: Charlie protocol 9:07, blunted BP and appropriate HR response on metoprolol. good ET [de-identified] : 7/1/22: \par EF 55%, E/A reversal, normal valvular function

## 2022-12-20 NOTE — HISTORY OF PRESENT ILLNESS
[FreeTextEntry1] : PCP: Dr. Mickie Resendiz\par \par 46F HTN, Grave's s/p MOLINA -hypothyroidism, psoriatic arthritis on Humira, migraines, POTS, who presents for follow-up of post acute sequelae of COVID-19\par \par COVID-19 most recently in May 2022, since then dx with POTS as well as mild postexertional fatigue\par HR appears to be improved with time and with metoprolol but still with fatigue\par Is also undergoing neurological eval and found to have some cognitive dysfunction\par \par Currently back to working from home from full time. She had modest improvement in her symptoms but is now again feeling post exertional fatigue, swings in her HR.\par \par \par Fam hx:\par No CAD or SCD\par \par Pregnancy hx:\par Twice\par PEC with first\par \par

## 2023-01-24 ENCOUNTER — APPOINTMENT (OUTPATIENT)
Dept: RHEUMATOLOGY | Facility: CLINIC | Age: 48
End: 2023-01-24

## 2023-02-05 ENCOUNTER — NON-APPOINTMENT (OUTPATIENT)
Age: 48
End: 2023-02-05

## 2023-02-06 ENCOUNTER — TRANSCRIPTION ENCOUNTER (OUTPATIENT)
Age: 48
End: 2023-02-06

## 2023-02-07 ENCOUNTER — TRANSCRIPTION ENCOUNTER (OUTPATIENT)
Age: 48
End: 2023-02-07

## 2023-02-14 ENCOUNTER — NON-APPOINTMENT (OUTPATIENT)
Age: 48
End: 2023-02-14

## 2023-02-16 ENCOUNTER — APPOINTMENT (OUTPATIENT)
Dept: ORTHOPEDIC SURGERY | Facility: CLINIC | Age: 48
End: 2023-02-16

## 2023-02-24 ENCOUNTER — TRANSCRIPTION ENCOUNTER (OUTPATIENT)
Age: 48
End: 2023-02-24

## 2023-02-27 ENCOUNTER — APPOINTMENT (OUTPATIENT)
Dept: ORTHOPEDIC SURGERY | Facility: CLINIC | Age: 48
End: 2023-02-27
Payer: COMMERCIAL

## 2023-02-27 VITALS — BODY MASS INDEX: 33.66 KG/M2 | WEIGHT: 202 LBS | HEIGHT: 65 IN

## 2023-02-27 DIAGNOSIS — G57.91 UNSPECIFIED MONONEUROPATHY OF RIGHT LOWER LIMB: ICD-10-CM

## 2023-02-27 PROCEDURE — 73630 X-RAY EXAM OF FOOT: CPT | Mod: RT

## 2023-02-27 PROCEDURE — 99214 OFFICE O/P EST MOD 30 MIN: CPT

## 2023-02-27 NOTE — PHYSICAL EXAM
[Right] : right foot and ankle [1st] : 1st [NL (40)] : plantar flexion 40 degrees [NL 30)] : inversion 30 degrees [NL (20)] : eversion 20 degrees [5___] : [facility] 5[unfilled]/5 [2+] : dorsalis pedis pulse: 2+ [] : patient ambulates without assistive device

## 2023-02-27 NOTE — HISTORY OF PRESENT ILLNESS
[10] : 10 [Dull/Aching] : dull/aching [Sharp] : sharp [Shooting] : shooting [Throbbing] : throbbing [Tingling] : tingling [de-identified] : 02/27/2023: great toe numbness and pain extending to other toes for 1 year. no injury. no tx to date. psoriatic arthritis on humira. reports periodic h/o lumbar pathology. denies dm/tob. [employer] employee\par  [] : no [FreeTextEntry1] : right foot/1st toe

## 2023-03-02 ENCOUNTER — APPOINTMENT (OUTPATIENT)
Dept: NEUROLOGY | Facility: CLINIC | Age: 48
End: 2023-03-02
Payer: COMMERCIAL

## 2023-03-02 DIAGNOSIS — R20.2 ANESTHESIA OF SKIN: ICD-10-CM

## 2023-03-02 DIAGNOSIS — R20.0 ANESTHESIA OF SKIN: ICD-10-CM

## 2023-03-02 PROCEDURE — 95886 MUSC TEST DONE W/N TEST COMP: CPT | Mod: NC

## 2023-03-02 PROCEDURE — 95912 NRV CNDJ TEST 11-12 STUDIES: CPT

## 2023-03-04 ENCOUNTER — APPOINTMENT (OUTPATIENT)
Dept: ORTHOPEDIC SURGERY | Facility: CLINIC | Age: 48
End: 2023-03-04
Payer: COMMERCIAL

## 2023-03-04 VITALS — BODY MASS INDEX: 33.66 KG/M2 | WEIGHT: 202 LBS | HEIGHT: 65 IN

## 2023-03-04 PROCEDURE — 72170 X-RAY EXAM OF PELVIS: CPT

## 2023-03-04 PROCEDURE — 72110 X-RAY EXAM L-2 SPINE 4/>VWS: CPT

## 2023-03-04 PROCEDURE — 99244 OFF/OP CNSLTJ NEW/EST MOD 40: CPT

## 2023-03-04 NOTE — DISCUSSION/SUMMARY
[de-identified] : numbness in the right foot - worse with activity - EMG/NCS with signs of radiculopathy \par xrays okay \par indicated for MRi L spine \par fu to review the MRi\par already on gaBABPENTIN

## 2023-03-04 NOTE — HISTORY OF PRESENT ILLNESS
[Lower back] : lower back [8] : 8 [7] : 7 [de-identified] : 3/4/23: 48 yo F with numbness/tingling in the right foot - long standing chronic back pain - WORSE numbness with walking - left leg is okay - no loss of bb control \par \par Saw Dr Sanchez who ordered EMG/NCS and referred here \par \par No pain management injections \par \par see rheum and has psoriatic arthritis\par on humira injections \par hypothroid\par headaches - on gabapentin for this - 300 at night \par has had covid 4x and developed postural blood pressure now on metoprolol\par \par xrays today:\par l spine - \par AP PELVIS - \par \par EMG/NCS 3/2/23 - RIGHT SIDED MILD l5/s1 RADICULOAPTHY \par  [] : no [FreeTextEntry5] : pt saw dr. khan on thursday and got an emg. pt feels numbness in right toes.  [FreeTextEntry7] : right toes  [de-identified] : 3/2/23 [de-identified] : pt got emg

## 2023-03-06 ENCOUNTER — APPOINTMENT (OUTPATIENT)
Dept: ORTHOPEDIC SURGERY | Facility: CLINIC | Age: 48
End: 2023-03-06

## 2023-03-14 ENCOUNTER — FORM ENCOUNTER (OUTPATIENT)
Age: 48
End: 2023-03-14

## 2023-03-15 ENCOUNTER — APPOINTMENT (OUTPATIENT)
Dept: MRI IMAGING | Facility: CLINIC | Age: 48
End: 2023-03-15
Payer: COMMERCIAL

## 2023-03-15 PROCEDURE — 72148 MRI LUMBAR SPINE W/O DYE: CPT

## 2023-03-20 ENCOUNTER — RX RENEWAL (OUTPATIENT)
Age: 48
End: 2023-03-20

## 2023-03-22 ENCOUNTER — TRANSCRIPTION ENCOUNTER (OUTPATIENT)
Age: 48
End: 2023-03-22

## 2023-03-23 ENCOUNTER — TRANSCRIPTION ENCOUNTER (OUTPATIENT)
Age: 48
End: 2023-03-23

## 2023-03-27 ENCOUNTER — RX RENEWAL (OUTPATIENT)
Age: 48
End: 2023-03-27

## 2023-03-31 ENCOUNTER — APPOINTMENT (OUTPATIENT)
Dept: RHEUMATOLOGY | Facility: CLINIC | Age: 48
End: 2023-03-31
Payer: COMMERCIAL

## 2023-03-31 VITALS
HEIGHT: 65 IN | WEIGHT: 219 LBS | BODY MASS INDEX: 36.49 KG/M2 | SYSTOLIC BLOOD PRESSURE: 137 MMHG | DIASTOLIC BLOOD PRESSURE: 89 MMHG | HEART RATE: 93 BPM | OXYGEN SATURATION: 99 %

## 2023-03-31 PROCEDURE — 99215 OFFICE O/P EST HI 40 MIN: CPT

## 2023-03-31 RX ORDER — METHYLPREDNISOLONE 4 MG/1
4 TABLET ORAL
Qty: 1 | Refills: 0 | Status: ACTIVE | COMMUNITY
Start: 2023-03-31 | End: 1900-01-01

## 2023-04-03 LAB
ALBUMIN SERPL ELPH-MCNC: 4.5 G/DL
ALP BLD-CCNC: 60 U/L
ALT SERPL-CCNC: 21 U/L
ANION GAP SERPL CALC-SCNC: 12 MMOL/L
AST SERPL-CCNC: 20 U/L
BASOPHILS # BLD AUTO: 0.07 K/UL
BASOPHILS NFR BLD AUTO: 0.9 %
BILIRUB SERPL-MCNC: 0.4 MG/DL
BUN SERPL-MCNC: 12 MG/DL
CALCIUM SERPL-MCNC: 9.2 MG/DL
CHLORIDE SERPL-SCNC: 101 MMOL/L
CO2 SERPL-SCNC: 25 MMOL/L
COVID-19 NUCLEOCAPSID  GAM ANTIBODY INTERPRETATION: POSITIVE
COVID-19 SPIKE DOMAIN ANTIBODY INTERPRETATION: POSITIVE
CREAT SERPL-MCNC: 0.71 MG/DL
CRP SERPL-MCNC: <3 MG/L
EGFR: 107 ML/MIN/1.73M2
EOSINOPHIL # BLD AUTO: 0.51 K/UL
EOSINOPHIL NFR BLD AUTO: 6.6 %
ERYTHROCYTE [SEDIMENTATION RATE] IN BLOOD BY WESTERGREN METHOD: 6 MM/HR
HCT VFR BLD CALC: 42 %
HGB BLD-MCNC: 12.6 G/DL
IMM GRANULOCYTES NFR BLD AUTO: 0.3 %
LYMPHOCYTES # BLD AUTO: 1.71 K/UL
LYMPHOCYTES NFR BLD AUTO: 22.1 %
MAN DIFF?: NORMAL
MCHC RBC-ENTMCNC: 27.2 PG
MCHC RBC-ENTMCNC: 30 GM/DL
MCV RBC AUTO: 90.5 FL
MONOCYTES # BLD AUTO: 0.4 K/UL
MONOCYTES NFR BLD AUTO: 5.2 %
NEUTROPHILS # BLD AUTO: 5.04 K/UL
NEUTROPHILS NFR BLD AUTO: 64.9 %
PLATELET # BLD AUTO: 395 K/UL
POTASSIUM SERPL-SCNC: 4.2 MMOL/L
PROT SERPL-MCNC: 6.7 G/DL
RBC # BLD: 4.64 M/UL
RBC # FLD: 14.2 %
SARS-COV-2 AB SERPL IA-ACNC: >250 U/ML
SARS-COV-2 AB SERPL QL IA: 185 INDEX
SODIUM SERPL-SCNC: 138 MMOL/L
WBC # FLD AUTO: 7.75 K/UL

## 2023-04-05 ENCOUNTER — TRANSCRIPTION ENCOUNTER (OUTPATIENT)
Age: 48
End: 2023-04-05

## 2023-04-06 ENCOUNTER — TRANSCRIPTION ENCOUNTER (OUTPATIENT)
Age: 48
End: 2023-04-06

## 2023-04-06 NOTE — HISTORY OF PRESENT ILLNESS
[FreeTextEntry1] : Ms. Zarate has a PMHX psoriasis, graves, htn, anxiety here acutely for worsening pain\par \par PsA.  HLAB27 neg \par - skyrizi ([id number]) - effective only for skin, \par - otezla (diarrhea), \par - ? cosentyx vs stelara in the past\par -  humira (2021 - present).  very effective with reduction in pain and swelling of the otes and hands.   now without pain in the toes and the past this was source of great concern.   \par \par INTERVAL Hx\par here for chronic and acute issues\par \par - describes worsening of psoriasis after covid in february 2023\par - now has posterior neck plaques \par - tore rotator cuff with laundry \par - herniated disc with radiculopathy of the left toe numbness \par - here to discuss switch in therapy\par - previous to covid was doing quite well and the skin plaques were controlled [Anorexia] : no anorexia [Weight Loss] : no weight loss [Malaise] : no malaise [Fever] : no fever [Depression] : no depression [Skin Lesions] : no lesions [Oral Ulcers] : no oral ulcers [Cough] : no cough [Dry Mouth] : no dry mouth [Dysphonia] : no dysphonia [Dysphagia] : no dysphagia [Shortness of Breath] : no shortness of breath [Joint Swelling] : no joint swelling [Joint Warmth] : no joint warmth [Joint Deformity] : no joint deformity [Decreased ROM] : no decreased range of motion [Morning Stiffness] : no morning stiffness [Myalgias] : no myalgias [Difficulty Standing] : no difficulty standing [Visual Changes] : no visual changes [Eye Redness] : no eye redness [Dry Eyes] : no dry eyes

## 2023-04-06 NOTE — PHYSICAL EXAM
[General Appearance - Alert] : alert [General Appearance - In No Acute Distress] : in no acute distress [General Appearance - Well Nourished] : well nourished [General Appearance - Well Developed] : well developed [General Appearance - Well-Appearing] : healthy appearing [Sclera] : the sclera and conjunctiva were normal [Edema] : there was no peripheral edema [Veins - Varicosity Changes] : there were no varicosital changes [No CVA Tenderness] : no ~M costovertebral angle tenderness [No Spinal Tenderness] : no spinal tenderness [Abnormal Walk] : normal gait [Nail Clubbing] : no clubbing  or cyanosis of the fingernails [Musculoskeletal - Swelling] : no joint swelling seen [Motor Tone] : muscle strength and tone were normal [Skin Color & Pigmentation] : normal skin color and pigmentation [Skin Turgor] : normal skin turgor [] : no rash [Oriented To Time, Place, And Person] : oriented to person, place, and time [Impaired Insight] : insight and judgment were intact [Affect] : the affect was normal [FreeTextEntry1] : poster scalp line with plaques

## 2023-04-06 NOTE — ASSESSMENT
[FreeTextEntry1] : Ms. Zarate has a PMHX psoriasis, htn, graves, gerd, preeclampsia here with worsening symptoms \par \par here for both acute and chronic issues - multiple issues today\par \par # PsO /  PsA.  \par HLAB27 neg.  Previous treatment included skyrizi ([id number]) - effective only for skin, otezla (diarrhea), ? cosentyx vs stelara in the past.  started humira (2021 - present).  Was effective until recently.  Flare of the skin has been extensive particularly in the scalp.  However this directly followed a covid infection.  \par -- check inflammatory markers\par -- continue Humira for now.   If this flare all post covid, discussed that wouldn't want to stop the Humira pre-maturely \par -- would check the anti- adalimumab ab.  if positive then switch bDMARD, if negative would wait another month or so post-covid to see if flare subsides\par \par # radiculopathy, acute herniated disc, toe pain (now numbness)\par acute toe pain - but w/o s/s of inflammatory arthritis.  describes pain as electric shocks coming out the end - which makes it more concerning fro a neruopathy\par -- physical therapy\par -- f/u neuro \par -- MDP x 1\par \par #LOng COVID and POTS\par 2022 now on metoprolol - covid again February with worsening skin issues\par -- r/u cardio\par \par #BMI 35\par -- encouraged continued weight loss \par -- t/c low gluten diet  and high fiber fruits instead if okay by IBS\par \par #migraines \par possibly related to hypertension ? related to nsaid use - was using two prior to this \par -- working with neuro as well\par \par # Graves then hypothyroidism \par currently being regulated \par -- check TSh as hasn’t had this checked in a while\par \par #low vit d\par - supplement increased  and will \par \par #medication monitoring \par -- quant tb negative May 2022\par -- hepatitis negative May 2022\par -- now on NSAID and +h/o gerd take with food and  on omeprazole\par -- check labs\par \par \par More than 50% of the encounter was spent counseling the patient on differential, workup, disease course and treatment/management.  Education was provided to the patient during this encounter.  All questions and concerns were addressed and answered.   The patient verbalized understanding and agreed to the plan. \par \par Patient has been instructed to call for an appointment if new symptoms develop.\par Patient has been instructed to make a followup appointment in 3 months.\par \par Time spent on the encounter included, but is not limited to, preparing to see the patient, obtaining and/or reviewing separately obtained history, performing the evaluation, counseling and educating, independently interpreting results with communication to patient, order placement, referring and/or communicating with other health professionals as described, and documenting clinical information in the electronic health record\par \par

## 2023-04-07 ENCOUNTER — TRANSCRIPTION ENCOUNTER (OUTPATIENT)
Age: 48
End: 2023-04-07

## 2023-04-10 ENCOUNTER — TRANSCRIPTION ENCOUNTER (OUTPATIENT)
Age: 48
End: 2023-04-10

## 2023-04-11 ENCOUNTER — TRANSCRIPTION ENCOUNTER (OUTPATIENT)
Age: 48
End: 2023-04-11

## 2023-04-11 LAB
ADALIMUMAB AB SERPL-MCNC: <25 NG/ML
ADALIMUMAB SERPL-MCNC: 8.5 UG/ML

## 2023-04-14 ENCOUNTER — TRANSCRIPTION ENCOUNTER (OUTPATIENT)
Age: 48
End: 2023-04-14

## 2023-04-20 ENCOUNTER — APPOINTMENT (OUTPATIENT)
Dept: RHEUMATOLOGY | Facility: CLINIC | Age: 48
End: 2023-04-20
Payer: COMMERCIAL

## 2023-04-20 PROCEDURE — 99215 OFFICE O/P EST HI 40 MIN: CPT | Mod: 95

## 2023-04-24 RX ORDER — CETIRIZINE HYDROCHLORIDE 10 MG/1
10 TABLET, FILM COATED ORAL
Refills: 0 | Status: ACTIVE | OUTPATIENT
Start: 2023-04-24 | End: 1900-01-01

## 2023-04-24 RX ORDER — ACETAMINOPHEN 325 MG/1
325 TABLET ORAL
Refills: 0 | Status: ACTIVE | OUTPATIENT
Start: 2023-04-24 | End: 1900-01-01

## 2023-04-24 RX ORDER — DICLOFENAC SODIUM 75 MG/1
75 TABLET, DELAYED RELEASE ORAL
Qty: 180 | Refills: 3 | Status: ACTIVE | COMMUNITY
Start: 2023-04-24 | End: 1900-01-01

## 2023-04-24 RX ORDER — MELOXICAM 7.5 MG/1
7.5 TABLET ORAL TWICE DAILY
Qty: 60 | Refills: 3 | Status: COMPLETED | COMMUNITY
Start: 2021-03-18 | End: 2023-04-24

## 2023-04-24 RX ORDER — METHYLPREDNISOLONE 4 MG/1
4 TABLET ORAL
Qty: 1 | Refills: 0 | Status: ACTIVE | COMMUNITY
Start: 2023-04-24 | End: 1900-01-01

## 2023-04-24 RX ADMIN — GOLIMUMAB 0 MG/4ML: 50 SOLUTION INTRAVENOUS at 00:00

## 2023-04-24 RX ADMIN — CETIRIZINE HYDROCHLORIDE 1 MG: 10 TABLET, FILM COATED ORAL at 00:00

## 2023-04-24 RX ADMIN — ACETAMINOPHEN 2 MG: 325 TABLET ORAL at 00:00

## 2023-04-24 NOTE — HISTORY OF PRESENT ILLNESS
[Home] : at home, [unfilled] , at the time of the visit. [Other Location: e.g. Home (Enter Location, City,State)___] : at [unfilled] [Verbal consent obtained from patient] : the patient, [unfilled] [FreeTextEntry1] : Ms. Zarate has a PMHX psoriasis, graves, htn, anxiety here acutely for worsening pain\par \par PsA.  HLAB27 neg \par - skyrizi ([id number]) - effective only for skin, \par - otezla (diarrhea), \par - ? cosentyx vs stelara in the past\par -  humira (2021 - present).  very effective with reduction in pain and swelling\par \par INTERVAL Hx\par here for chronic and acute issues\par \par still feels terrible - everything hurts\par the medrol helped the skin but now coming back again\par - describes worsening of psoriasis after covid in february 2023\par - now has posterior neck plaques \par - tore rotator cuff with laundry \par - herniated disc with radiculopathy of the left toe numbness \par - here to discuss switch in therapy\par - previous to covid was doing quite well and the skin plaques were controlled [Weight Loss] : no weight loss [Anorexia] : no anorexia [Malaise] : no malaise [Fever] : no fever [Depression] : no depression [Skin Lesions] : no lesions [Oral Ulcers] : no oral ulcers [Cough] : no cough [Dry Mouth] : no dry mouth [Dysphagia] : no dysphagia [Dysphonia] : no dysphonia [Shortness of Breath] : no shortness of breath [Joint Swelling] : no joint swelling [Joint Warmth] : no joint warmth [Joint Deformity] : no joint deformity [Decreased ROM] : no decreased range of motion [Morning Stiffness] : no morning stiffness [Difficulty Standing] : no difficulty standing [Myalgias] : no myalgias [Visual Changes] : no visual changes [Eye Redness] : no eye redness [Dry Eyes] : no dry eyes

## 2023-04-24 NOTE — ASSESSMENT
[FreeTextEntry1] : Ms. Zarate has a PMHX psoriasis, htn, graves, gerd, preeclampsia here with worsening symptoms \par \par here for both acute and chronic issues - multiple issues today\par \par - f/u TEB thursday May 25 at 6 AM (patient aware)\par \par # PsO /  PsA.  \par HLAB27 neg.  Previous treatment included skyrizi ([id number]) - effective only for skin, otezla (diarrhea), ? cosentyx in the past.  started humira (2021 - present).  Was effective until recently.  Flare of the skin has been extensive particularly in the scalp.  However this directly followed a covid infection.  \par -- check inflammatory markers\par -- continue Humira for now.   If this flare all post covid, discussed that wouldn't want to stop the Humira pre-maturely \par --  anti- adalimumab ab are negative.   if positive then switch bDMARD, if negative would wait another month or so post-covid to see if flare subsides\par -- hasn’t tried NSAID at this point - can only take tylenol because \par -- dc meloxicam - start diclofenac\par -- MDP x 1 for now while switching to infusion \par -- start simponi -\par -- \par # radiculopathy, acute herniated disc, toe pain (now numbness)\par acute toe pain - but w/o s/s of inflammatory arthritis.  describes pain as electric shocks coming out the end - which makes it more concerning fro a neruopathy\par -- resolved after MDP\par \par #LOng COVID and POTS\par 2022 now on metoprolol - covid again February with worsening skin issues\par -- r/u cardio\par -- successfully made COVID nuclleocapsis ab (on immunosuppressant which could blunt response) - not neutralizeing ab but reassuring that there may be some protective ab for the next few months\par \par #BMI 35\par -- encouraged continued weight loss \par -- t/c low gluten diet  and high fiber fruits instead if okay by IBS\par \par #migraines \par possibly related to hypertension ? related to nsaid use - was using two prior to this \par -- working with neuro as well\par \par # Graves then hypothyroidism \par currently being regulated \par -- check TSh as hasn’t had this checked in a while\par \par #low vit d\par - supplement increased  and will \par \par #medication monitoring \par -- quant tb negative May 2022\par -- hepatitis negative May 2022\par -- now on NSAID and +h/o gerd take with food and  on omeprazole\par -- check labs\par -- simponi - r/b/a discussed - patient wants to continue \par \par \par More than 50% of the encounter was spent counseling the patient on differential, workup, disease course and treatment/management.  Education was provided to the patient during this encounter.  All questions and concerns were addressed and answered.   The patient verbalized understanding and agreed to the plan. \par \par Patient has been instructed to call for an appointment if new symptoms develop.\par Patient has been instructed to make a followup appointment in 3 months.\par \par Time spent on the encounter included, but is not limited to, preparing to see the patient, obtaining and/or reviewing separately obtained history, performing the evaluation, counseling and educating, independently interpreting results with communication to patient, order placement, referring and/or communicating with other health professionals as described, and documenting clinical information in the electronic health record\par \par

## 2023-05-30 ENCOUNTER — APPOINTMENT (OUTPATIENT)
Dept: RHEUMATOLOGY | Facility: CLINIC | Age: 48
End: 2023-05-30
Payer: COMMERCIAL

## 2023-05-30 VITALS
HEART RATE: 86 BPM | OXYGEN SATURATION: 98 % | SYSTOLIC BLOOD PRESSURE: 125 MMHG | TEMPERATURE: 98 F | DIASTOLIC BLOOD PRESSURE: 88 MMHG | RESPIRATION RATE: 17 BRPM

## 2023-05-30 VITALS
TEMPERATURE: 98 F | RESPIRATION RATE: 17 BRPM | HEART RATE: 75 BPM | DIASTOLIC BLOOD PRESSURE: 83 MMHG | OXYGEN SATURATION: 96 % | SYSTOLIC BLOOD PRESSURE: 120 MMHG

## 2023-05-30 PROCEDURE — 96413 CHEMO IV INFUSION 1 HR: CPT

## 2023-05-30 RX ORDER — CETIRIZINE HYDROCHLORIDE 10 MG/1
10 TABLET, FILM COATED ORAL
Qty: 0 | Refills: 0 | Status: COMPLETED
Start: 2023-04-24

## 2023-05-30 RX ORDER — GOLIMUMAB 50 MG/4ML
50 SOLUTION INTRAVENOUS
Qty: 0 | Refills: 0 | Status: COMPLETED
Start: 2023-04-24

## 2023-05-30 RX ORDER — ACETAMINOPHEN 325 MG/1
325 TABLET ORAL
Qty: 0 | Refills: 0 | Status: COMPLETED
Start: 2023-04-24

## 2023-05-30 NOTE — ED PROVIDER NOTE - NS_EDPROVIDERDISPOUSERTYPE_ED_A_ED
Procedure Date: 05/30/2023     Preoperative Diagnosis: Multiple Carious Teeth    Post Operative Diagnosis: Multiple Carious Teeth    Operative Procedure: Dental Rehabilitation    Procedure In Detail: The patient was taken to the operating room with preoperative sedation. A breathe-down oral tube was placed. Following this, the patient was draped in a manner customary for dental procedures and a throat pack was placed.  5 periapical radiographs were taken. Prophylaxis and oral exam were then completed. The following teeth were then restored:    A:  OL resin  B:SSC pulp  C:B resin  D:ext  E:ext  F:ext  G:ext  H:DFL resin  I:SSC  J:OL resin  K:SSC pulp  L:SSCpulp  M:B resin  R:B resin  S:SSC pulp  T:OBL resin        Following the restorative procedure, the mouth was irrigated and topical fluoride was applied. The throat pack was removed. The patient was extubated in the OR and taken to recovery in satisfactory condition. The patient tolerated the 35 minute procedure well.    Discharge Summary (for less than 48 hrs)    Discharge Date: 05/30/2023      Diagnosis, treatment, procedures or surgery: successful surgery    Disposition of case: home    Provision for follow up care: call office if needed, 6 month follow up care    Post Op Dental Orders    1 Vital signs q15 minutes until stable  2 Check extraction sites for bleeding  3 DC IV when awake, alert, and stable  4 Consult anesthesia for medication for nausea/vomiting  5 Soft diet for 24 hours  6 No dental contraindications for discharge  7 Discharge when meets criteria    Attending Attestation (For Attendings USE Only)...

## 2023-05-30 NOTE — HISTORY OF PRESENT ILLNESS
[5] : 5 [Denies] : Denies [No] : No [Yes] : Yes [TB] : Tuberculosis screening [Hep acute panel] : Hepatitis acute panel [de-identified] : joints [de-identified] : first infusion [Left upper extremity] : Left upper extremity [24g] : 24g [Start Time: ___] : Medication Start Time: [unfilled] [End Time: ___] : Medication End Time: [unfilled] [IV discontinued. Intact. No signs or symptoms of IV complications noted. Time: ___] : IV discontinued. Intact. No signs or symptoms of IV complications noted. Time: [unfilled] [Patient  instructed to seek medical attention with signs and symptoms of adverse effects] : Patient  instructed to seek medical attention with signs and symptoms of adverse effects [Patient left unit in no acute distress] : Patient left unit in no acute distress [Medications administered as ordered and tolerated well.] : Medications administered as ordered and tolerated well. [de-identified] : 5:10 pm [de-identified] : kept pt half hour post infusion for observation

## 2023-06-26 ENCOUNTER — RX RENEWAL (OUTPATIENT)
Age: 48
End: 2023-06-26

## 2023-06-26 ENCOUNTER — TRANSCRIPTION ENCOUNTER (OUTPATIENT)
Age: 48
End: 2023-06-26

## 2023-06-27 ENCOUNTER — APPOINTMENT (OUTPATIENT)
Dept: RHEUMATOLOGY | Facility: CLINIC | Age: 48
End: 2023-06-27
Payer: COMMERCIAL

## 2023-06-27 VITALS
DIASTOLIC BLOOD PRESSURE: 87 MMHG | TEMPERATURE: 98 F | RESPIRATION RATE: 17 BRPM | HEART RATE: 75 BPM | OXYGEN SATURATION: 98 % | SYSTOLIC BLOOD PRESSURE: 120 MMHG

## 2023-06-27 VITALS
SYSTOLIC BLOOD PRESSURE: 133 MMHG | OXYGEN SATURATION: 98 % | RESPIRATION RATE: 17 BRPM | TEMPERATURE: 98 F | HEART RATE: 78 BPM | DIASTOLIC BLOOD PRESSURE: 86 MMHG

## 2023-06-27 PROCEDURE — 96413 CHEMO IV INFUSION 1 HR: CPT

## 2023-06-27 RX ORDER — GOLIMUMAB 50 MG/4ML
50 SOLUTION INTRAVENOUS
Qty: 0 | Refills: 0 | Status: COMPLETED
Start: 2023-04-24

## 2023-06-27 RX ORDER — ACETAMINOPHEN 325 MG/1
325 TABLET ORAL
Qty: 0 | Refills: 0 | Status: COMPLETED
Start: 2023-04-24

## 2023-06-27 RX ORDER — CETIRIZINE HYDROCHLORIDE 10 MG/1
10 TABLET, FILM COATED ORAL
Qty: 0 | Refills: 0 | Status: COMPLETED
Start: 2023-04-24

## 2023-06-27 NOTE — HISTORY OF PRESENT ILLNESS
[Denies] : Denies [No] : No [Yes] : Yes [Declined] : Declined [TB] : Tuberculosis screening [Hep acute panel] : Hepatitis acute panel [Left upper extremity] : Left upper extremity [24g] : 24g [Start Time: ___] : Medication Start Time: [unfilled] [End Time: ___] : Medication End Time: [unfilled] [IV discontinued. Intact. No signs or symptoms of IV complications noted. Time: ___] : IV discontinued. Intact. No signs or symptoms of IV complications noted. Time: [unfilled] [Patient  instructed to seek medical attention with signs and symptoms of adverse effects] : Patient  instructed to seek medical attention with signs and symptoms of adverse effects [Patient left unit in no acute distress] : Patient left unit in no acute distress [Medications administered as ordered and tolerated well.] : Medications administered as ordered and tolerated well. [de-identified] : 5:10 PM [de-identified] : script given to patient to do blood work.\par 8/22/23- next infusion appointment

## 2023-06-28 ENCOUNTER — TRANSCRIPTION ENCOUNTER (OUTPATIENT)
Age: 48
End: 2023-06-28

## 2023-08-15 ENCOUNTER — TRANSCRIPTION ENCOUNTER (OUTPATIENT)
Age: 48
End: 2023-08-15

## 2023-08-23 ENCOUNTER — APPOINTMENT (OUTPATIENT)
Dept: RHEUMATOLOGY | Facility: CLINIC | Age: 48
End: 2023-08-23

## 2023-08-23 ENCOUNTER — APPOINTMENT (OUTPATIENT)
Dept: RHEUMATOLOGY | Facility: CLINIC | Age: 48
End: 2023-08-23
Payer: COMMERCIAL

## 2023-08-23 VITALS
WEIGHT: 212 LBS | SYSTOLIC BLOOD PRESSURE: 123 MMHG | DIASTOLIC BLOOD PRESSURE: 87 MMHG | BODY MASS INDEX: 35.32 KG/M2 | HEART RATE: 77 BPM | OXYGEN SATURATION: 98 % | HEIGHT: 65 IN | TEMPERATURE: 97 F

## 2023-08-23 DIAGNOSIS — M79.676 PAIN IN UNSPECIFIED TOE(S): ICD-10-CM

## 2023-08-23 DIAGNOSIS — Z71.85 ENCOUNTER FOR IMMUNIZATION SAFETY COUNSELING: ICD-10-CM

## 2023-08-23 DIAGNOSIS — Z86.16 PERSONAL HISTORY OF COVID-19: ICD-10-CM

## 2023-08-23 DIAGNOSIS — Z11.59 ENCOUNTER FOR SCREENING FOR OTHER VIRAL DISEASES: ICD-10-CM

## 2023-08-23 DIAGNOSIS — Z71.9 COUNSELING, UNSPECIFIED: ICD-10-CM

## 2023-08-23 DIAGNOSIS — Z87.2 PERSONAL HISTORY OF DISEASES OF THE SKIN AND SUBCUTANEOUS TISSUE: ICD-10-CM

## 2023-08-23 PROCEDURE — 99215 OFFICE O/P EST HI 40 MIN: CPT

## 2023-08-23 RX ORDER — ADALIMUMAB 40MG/0.4ML
40 KIT SUBCUTANEOUS
Qty: 3 | Refills: 5 | Status: DISCONTINUED | COMMUNITY
Start: 2021-03-25 | End: 2023-08-23

## 2023-08-23 RX ORDER — METHYLPREDNISOLONE 4 MG/1
4 TABLET ORAL
Qty: 1 | Refills: 0 | Status: ACTIVE | COMMUNITY
Start: 2023-08-23 | End: 1900-01-01

## 2023-08-23 NOTE — PHYSICAL EXAM
[General Appearance - Alert] : alert [General Appearance - In No Acute Distress] : in no acute distress [General Appearance - Well Nourished] : well nourished [General Appearance - Well Developed] : well developed [General Appearance - Well-Appearing] : healthy appearing [Sclera] : the sclera and conjunctiva were normal [Abnormal Walk] : normal gait [Nail Clubbing] : no clubbing  or cyanosis of the fingernails [Musculoskeletal - Swelling] : no joint swelling seen [Motor Tone] : muscle strength and tone were normal [Skin Color & Pigmentation] : normal skin color and pigmentation [Skin Turgor] : normal skin turgor [] : no rash [Oriented To Time, Place, And Person] : oriented to person, place, and time [Impaired Insight] : insight and judgment were intact [Affect] : the affect was normal [FreeTextEntry1] : tearful at times

## 2023-08-23 NOTE — CONSULT LETTER
[Dear  ___] : Dear  [unfilled], [Courtesy Letter:] : I had the pleasure of seeing your patient, [unfilled], in my office today. [Please see my note below.] : Please see my note below. [Sincerely,] : Sincerely, [FreeTextEntry2] : Dr. Abbey Resendiz

## 2023-08-23 NOTE — HISTORY OF PRESENT ILLNESS
[FreeTextEntry1] : Ms. Zarate has a PMHX psoriasis, graves, htn, anxiety here acutely for worsening pain  Background / presentation PsA.  HLAB27 neg  - skyrizi ([id number]) - effective only for skin,  - otezla (diarrhea),  - ? cosentyx vs stelara in the past -  humira (2021 - 2023).  very effective with reduction in pain and swelling -  but secondary failure after covid infection  - simponi (2023 - present)  INTERVAL Hx  urgent same day visit today - from infusion room feels terrible.  the skin is flaring - the joints hurt all the time  difficulty with moving around-and getting up even out of a chair - but no swelling  has very bad veins - stuck 8 times for todays infusion - unsuccessful no constitutional symptoms

## 2023-08-23 NOTE — ASSESSMENT
[FreeTextEntry1] : Ms. Zarate has a PMHX psoriasis, htn, graves, gerd, preeclampsia here with worsening symptoms   # PsO /  PsA.   HLAB27 neg.  Previous treatment included skyrizi ([id number]) - effective only for skin, otezla (diarrhea), ? cosentyx in the past.  started humira (2021 - 2023).  Was effective until recently.  Flare of the skin has been extensive particularly in the scalp - with loss of control following covid.  Started Simponi - s/p 2 infusions was here for the third today.   -- unable to have infusion today as no access - this has been a serious issue with her infusions -- currently in a flare of her condition both skin and joints  -- check inflammatory markers -- dc meloxicam - start diclofenac -- MDP x 1 for now while switching -- d/w patient options - reattempt Simponi next week - switch to Remicade (may need port to continue infusion medications) start injection drug eg stelara.  patient opts for stelara  # radiculopathy, acute herniated disc, toe pain (now numbness) acute toe pain - but w/o s/s of inflammatory arthritis.  describes pain as electric shocks coming out the end - which makes it more concerning fro a neruopathy -- resolved after MDP  #LOng COVID and POTS 2022 now on metoprolol - covid again February with worsening skin issues -- r/u cardio -- successfully made COVID nuclleocapsis ab (on immunosuppressant which could blunt response) - not neutralizeing ab but reassuring that there may be some protective ab for the next few months  #BMI 35 -- encouraged continued weight loss  -- t/c low gluten diet  and high fiber fruits instead if okay by IBS  #migraines  possibly related to hypertension ? related to nsaid use - was using two prior to this  -- working with neuro as well  # Graves then hypothyroidism  currently being regulated  -- check TSh as hasn't had this checked in a while  #low vit d - supplement increased  and will   #medication monitoring  -- quant tb negative May 2022 -- hepatitis negative May 2022 -- now on NSAID and +h/o gerd take with food and  on omeprazole -- check labs -- patient wants to swithc to injection.   d/w patient stelar.  weight 212 pounds = 98 kg.   therefore 45 mg at 0 and 4 weeks and then every 12 weeks therafter   More than 50% of the encounter was spent counseling the patient on differential, workup, disease course and treatment/management.  Education was provided to the patient during this encounter.  All questions and concerns were addressed and answered.   The patient verbalized understanding and agreed to the plan.   Patient has been instructed to call for an appointment if new symptoms develop. Patient has been instructed to make a followup appointment in 3 months.  Time spent on the encounter included, but is not limited to, preparing to see the patient, obtaining and/or reviewing separately obtained history, performing the evaluation, counseling and educating, independently interpreting results with communication to patient, order placement, referring and/or communicating with other health professionals as described, and documenting clinical information in the electronic health record

## 2023-08-28 RX ORDER — USTEKINUMAB 45 MG/.5ML
45 INJECTION, SOLUTION SUBCUTANEOUS
Qty: 2 | Refills: 3 | Status: ACTIVE | COMMUNITY
Start: 2023-08-28 | End: 1900-01-01

## 2023-08-28 RX ORDER — USTEKINUMAB 45 MG/.5ML
45 INJECTION, SOLUTION SUBCUTANEOUS
Qty: 1 | Refills: 3 | Status: ACTIVE | COMMUNITY
Start: 2023-08-27 | End: 1900-01-01

## 2023-08-28 RX ORDER — USTEKINUMAB 45 MG/.5ML
45 INJECTION, SOLUTION SUBCUTANEOUS
Qty: 1 | Refills: 3 | Status: COMPLETED | COMMUNITY
Start: 2023-08-23 | End: 2023-08-28

## 2023-08-28 RX ORDER — GOLIMUMAB 50 MG/4ML
50 SOLUTION INTRAVENOUS
Refills: 0 | Status: DISCONTINUED | OUTPATIENT
Start: 2023-04-24 | End: 2023-08-28

## 2023-08-31 ENCOUNTER — TRANSCRIPTION ENCOUNTER (OUTPATIENT)
Age: 48
End: 2023-08-31

## 2023-08-31 LAB
ALBUMIN SERPL ELPH-MCNC: 4.6 G/DL
ALP BLD-CCNC: 66 U/L
ALT SERPL-CCNC: 28 U/L
ANION GAP SERPL CALC-SCNC: 13 MMOL/L
AST SERPL-CCNC: 18 U/L
BILIRUB SERPL-MCNC: 0.3 MG/DL
BUN SERPL-MCNC: 11 MG/DL
CALCIUM SERPL-MCNC: 9.5 MG/DL
CHLORIDE SERPL-SCNC: 99 MMOL/L
CO2 SERPL-SCNC: 28 MMOL/L
CREAT SERPL-MCNC: 0.87 MG/DL
CRP SERPL-MCNC: 13 MG/L
EGFR: 84 ML/MIN/1.73M2
ERYTHROCYTE [SEDIMENTATION RATE] IN BLOOD BY WESTERGREN METHOD: 17 MM/HR
HAV IGM SER QL: NONREACTIVE
HBV CORE IGG+IGM SER QL: NONREACTIVE
HBV CORE IGM SER QL: NONREACTIVE
HBV SURFACE AG SER QL: NONREACTIVE
HCT VFR BLD CALC: 42.9 %
HCV AB SER QL: NONREACTIVE
HCV S/CO RATIO: 0.05 S/CO
HGB BLD-MCNC: 13.2 G/DL
MCHC RBC-ENTMCNC: 27.5 PG
MCHC RBC-ENTMCNC: 30.8 GM/DL
MCV RBC AUTO: 89.4 FL
PLATELET # BLD AUTO: 406 K/UL
POTASSIUM SERPL-SCNC: 4.6 MMOL/L
PROT SERPL-MCNC: 6.7 G/DL
RBC # BLD: 4.8 M/UL
RBC # FLD: 14.2 %
SODIUM SERPL-SCNC: 140 MMOL/L
WBC # FLD AUTO: 6.52 K/UL

## 2023-09-01 ENCOUNTER — TRANSCRIPTION ENCOUNTER (OUTPATIENT)
Age: 48
End: 2023-09-01

## 2023-09-01 LAB
M TB IFN-G BLD-IMP: NEGATIVE
QUANTIFERON TB PLUS MITOGEN MINUS NIL: 9.73 IU/ML
QUANTIFERON TB PLUS NIL: 0.03 IU/ML
QUANTIFERON TB PLUS TB1 MINUS NIL: -0.01 IU/ML
QUANTIFERON TB PLUS TB2 MINUS NIL: 0 IU/ML

## 2023-09-05 ENCOUNTER — TRANSCRIPTION ENCOUNTER (OUTPATIENT)
Age: 48
End: 2023-09-05

## 2023-09-20 ENCOUNTER — TRANSCRIPTION ENCOUNTER (OUTPATIENT)
Age: 48
End: 2023-09-20

## 2023-09-21 ENCOUNTER — APPOINTMENT (OUTPATIENT)
Dept: ORTHOPEDIC SURGERY | Facility: CLINIC | Age: 48
End: 2023-09-21
Payer: COMMERCIAL

## 2023-09-21 VITALS — WEIGHT: 215 LBS | BODY MASS INDEX: 35.82 KG/M2 | HEIGHT: 65 IN

## 2023-09-21 DIAGNOSIS — Z86.39 PERSONAL HISTORY OF OTHER ENDOCRINE, NUTRITIONAL AND METABOLIC DISEASE: ICD-10-CM

## 2023-09-21 DIAGNOSIS — Z87.19 PERSONAL HISTORY OF OTHER DISEASES OF THE DIGESTIVE SYSTEM: ICD-10-CM

## 2023-09-21 PROCEDURE — 99213 OFFICE O/P EST LOW 20 MIN: CPT

## 2023-09-21 PROCEDURE — 73610 X-RAY EXAM OF ANKLE: CPT | Mod: LT

## 2023-09-22 ENCOUNTER — RX RENEWAL (OUTPATIENT)
Age: 48
End: 2023-09-22

## 2023-09-22 RX ORDER — METOPROLOL SUCCINATE 50 MG/1
50 TABLET, EXTENDED RELEASE ORAL DAILY
Qty: 90 | Refills: 0 | Status: ACTIVE | COMMUNITY
Start: 2022-07-11 | End: 1900-01-01

## 2023-10-02 ENCOUNTER — TRANSCRIPTION ENCOUNTER (OUTPATIENT)
Age: 48
End: 2023-10-02

## 2023-10-10 ENCOUNTER — TRANSCRIPTION ENCOUNTER (OUTPATIENT)
Age: 48
End: 2023-10-10

## 2023-10-11 ENCOUNTER — TRANSCRIPTION ENCOUNTER (OUTPATIENT)
Age: 48
End: 2023-10-11

## 2023-10-13 ENCOUNTER — APPOINTMENT (OUTPATIENT)
Dept: RHEUMATOLOGY | Facility: CLINIC | Age: 48
End: 2023-10-13

## 2023-10-17 ENCOUNTER — APPOINTMENT (OUTPATIENT)
Dept: RHEUMATOLOGY | Facility: CLINIC | Age: 48
End: 2023-10-17
Payer: COMMERCIAL

## 2023-10-17 ENCOUNTER — LABORATORY RESULT (OUTPATIENT)
Age: 48
End: 2023-10-17

## 2023-10-17 ENCOUNTER — NON-APPOINTMENT (OUTPATIENT)
Age: 48
End: 2023-10-17

## 2023-10-17 VITALS
HEART RATE: 86 BPM | OXYGEN SATURATION: 100 % | HEIGHT: 65 IN | BODY MASS INDEX: 35.99 KG/M2 | WEIGHT: 216 LBS | SYSTOLIC BLOOD PRESSURE: 122 MMHG | DIASTOLIC BLOOD PRESSURE: 79 MMHG

## 2023-10-17 DIAGNOSIS — E05.00 THYROTOXICOSIS WITH DIFFUSE GOITER W/OUT THYROTOXIC CRISIS OR STORM: ICD-10-CM

## 2023-10-17 DIAGNOSIS — Z79.899 OTHER LONG TERM (CURRENT) DRUG THERAPY: ICD-10-CM

## 2023-10-17 PROCEDURE — 99214 OFFICE O/P EST MOD 30 MIN: CPT

## 2023-10-18 ENCOUNTER — TRANSCRIPTION ENCOUNTER (OUTPATIENT)
Age: 48
End: 2023-10-18

## 2023-10-18 LAB
ALBUMIN SERPL ELPH-MCNC: 4.3 G/DL
ALP BLD-CCNC: 55 U/L
ALT SERPL-CCNC: 33 U/L
ANION GAP SERPL CALC-SCNC: 10 MMOL/L
AST SERPL-CCNC: 23 U/L
BILIRUB SERPL-MCNC: 0.3 MG/DL
BUN SERPL-MCNC: 11 MG/DL
CALCIUM SERPL-MCNC: 8.9 MG/DL
CHLORIDE SERPL-SCNC: 102 MMOL/L
CO2 SERPL-SCNC: 26 MMOL/L
CREAT SERPL-MCNC: 0.82 MG/DL
CRP SERPL-MCNC: 5 MG/L
EGFR: 90 ML/MIN/1.73M2
ERYTHROCYTE [SEDIMENTATION RATE] IN BLOOD BY WESTERGREN METHOD: 8 MM/HR
HCT VFR BLD CALC: 41.2 %
HGB BLD-MCNC: 12.9 G/DL
MCHC RBC-ENTMCNC: 28.4 PG
MCHC RBC-ENTMCNC: 31.3 GM/DL
MCV RBC AUTO: 90.7 FL
PLATELET # BLD AUTO: 334 K/UL
POTASSIUM SERPL-SCNC: 5.1 MMOL/L
PROT SERPL-MCNC: 6.2 G/DL
RBC # BLD: 4.54 M/UL
RBC # FLD: 13.7 %
SODIUM SERPL-SCNC: 138 MMOL/L
T3 SERPL-MCNC: 96 NG/DL
T3RU NFR SERPL: 1.1 TBI
T4 SERPL-MCNC: 7.8 UG/DL
TSH SERPL-ACNC: 15.8 UIU/ML
WBC # FLD AUTO: 5.5 K/UL

## 2023-10-20 ENCOUNTER — TRANSCRIPTION ENCOUNTER (OUTPATIENT)
Age: 48
End: 2023-10-20

## 2023-12-30 ENCOUNTER — NON-APPOINTMENT (OUTPATIENT)
Age: 48
End: 2023-12-30

## 2024-01-02 ENCOUNTER — TRANSCRIPTION ENCOUNTER (OUTPATIENT)
Age: 49
End: 2024-01-02

## 2024-01-19 ENCOUNTER — APPOINTMENT (OUTPATIENT)
Dept: RHEUMATOLOGY | Facility: CLINIC | Age: 49
End: 2024-01-19
Payer: COMMERCIAL

## 2024-01-19 VITALS
HEART RATE: 100 BPM | RESPIRATION RATE: 20 BRPM | SYSTOLIC BLOOD PRESSURE: 135 MMHG | DIASTOLIC BLOOD PRESSURE: 101 MMHG | HEIGHT: 65 IN | BODY MASS INDEX: 35.82 KG/M2 | OXYGEN SATURATION: 99 % | WEIGHT: 215 LBS

## 2024-01-19 VITALS — SYSTOLIC BLOOD PRESSURE: 136 MMHG | DIASTOLIC BLOOD PRESSURE: 91 MMHG

## 2024-01-19 DIAGNOSIS — M54.17 RADICULOPATHY, LUMBOSACRAL REGION: ICD-10-CM

## 2024-01-19 DIAGNOSIS — M70.60 TROCHANTERIC BURSITIS, UNSPECIFIED HIP: ICD-10-CM

## 2024-01-19 DIAGNOSIS — M25.569 PAIN IN UNSPECIFIED KNEE: ICD-10-CM

## 2024-01-19 DIAGNOSIS — U09.9 POST COVID-19 CONDITION, UNSPECIFIED: ICD-10-CM

## 2024-01-19 DIAGNOSIS — M35.3 POLYMYALGIA RHEUMATICA: ICD-10-CM

## 2024-01-19 DIAGNOSIS — I10 ESSENTIAL (PRIMARY) HYPERTENSION: ICD-10-CM

## 2024-01-19 PROCEDURE — G2211 COMPLEX E/M VISIT ADD ON: CPT

## 2024-01-19 PROCEDURE — 99215 OFFICE O/P EST HI 40 MIN: CPT

## 2024-01-19 RX ORDER — CYCLOBENZAPRINE HYDROCHLORIDE 10 MG/1
10 TABLET, FILM COATED ORAL
Qty: 10 | Refills: 0 | Status: ACTIVE | COMMUNITY
Start: 2024-01-19 | End: 1900-01-01

## 2024-01-20 PROBLEM — M35.3 POLYMYALGIA: Status: RESOLVED | Noted: 2023-04-24 | Resolved: 2024-01-20

## 2024-01-20 PROBLEM — M25.569 PAIN, KNEE: Status: RESOLVED | Noted: 2023-03-31 | Resolved: 2024-01-20

## 2024-01-20 PROBLEM — U09.9 POST-ACUTE SEQUELAE OF COVID-19 (PASC): Status: RESOLVED | Noted: 2022-07-01 | Resolved: 2024-01-20

## 2024-01-20 PROBLEM — I10 HYPERTENSION: Status: ACTIVE | Noted: 2021-01-12

## 2024-01-20 PROBLEM — M54.17 LUMBOSACRAL RADICULOPATHY: Status: ACTIVE | Noted: 2023-03-02

## 2024-01-20 LAB
ERYTHROCYTE [SEDIMENTATION RATE] IN BLOOD BY WESTERGREN METHOD: 11 MM/HR
HCT VFR BLD CALC: 41.9 %
HGB BLD-MCNC: 12.6 G/DL
MCHC RBC-ENTMCNC: 27.7 PG
MCHC RBC-ENTMCNC: 30.1 GM/DL
MCV RBC AUTO: 92.1 FL
PLATELET # BLD AUTO: 443 K/UL
RBC # BLD: 4.55 M/UL
RBC # FLD: 15.6 %
WBC # FLD AUTO: 9.55 K/UL

## 2024-01-20 NOTE — HISTORY OF PRESENT ILLNESS
[FreeTextEntry1] :  Ms. Zarate has a PMHX psoriasis, PsA, graves, htn, anxiety here acutely for routine follow-up  Background / presentation PsA. HLAB27 neg - skyrizi ([id number]) - effective only for skin, - otezla (diarrhea), - ? cosentyx in the past - humira (2021 - 2023). very effective with reduction in pain and swelling - but secondary failure after covid infection - simponi (2023 - 2023) - stelara (2023 - present)  INTERVAL 01/19/24 Improvement in skin now limited to scalp, small intermittent patches. Bilateral knee pain, hands, elbows flare intermittently, back pain Morning stiffness same throughout the day Last dose Stelara Sunday 01/14/24, might feel better immediately after injection but not pronounced improvement Tylenol PRN  Elevated /101 --> 136/91 asymptomatic working w/ PCP - no alarm sxs   Recent COVID infection 12/30/23 tested positive, tx paxlovid Held Stelara inj x 2 weeks

## 2024-01-20 NOTE — ASSESSMENT
[FreeTextEntry1] : .  Ms. Zarate has a PMHX psoriasis, PsA, graves, htn, anxiety here acutely for routine follow-up  multiple issues addressed today  acute and chronic  #PsA HLA B27 negative, failed multiple other DMARD including skyrizi, otezla, cosentyx, humira, simponi.  Now on stelara.  Today reports near complete resolution of skin rash, limited patch to scalp <1%, however does c/o increasing joint pain b/l hands and knees. No active synovitis on exam, but ttt. Unclear if arthralgias r/t inadequate PsA control --Will complete f/u labs --PT b/l knees ordered --Continue Stelara --May consider steroid inj/PO if no response to PT. Will need better control HTN for PO steroid  #Lumbar Radiculopathy- +straight leg test on exam, however difficult to differentiate from possible limitation r/t R GTB. 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 Patient has not had additional follow-up with orthopedics or PT. --Follow-up with orthopedics for evaluation and repeat imaging --Rx Flexeril 10mg take 1/2 tab to full tab at bedtime PRN --Ordered PT for back  #R GTB- Tender on exam and w/ movement. Patient not interested in steroid injection at this time. Amendable to other non-pharm therapies. Will need to resolve R GTB to appropriately assess LS. --Sleep left sided --Ice multiple times throughout the day --Ordered PT for GTB --Topical Icy Hot w/ lidocaine -- t/c injection if above conservative measures fail - declined today  #HTN BP elevated in-office initial 135/101-->136/91 asymptomatic followed by PCP closely f/u appt 01/22/24. Unable to rx PO steroid given potential SE -Continue to monitor home BP -Present to ED for immediate evaluation if symptoms of HTN emergency, reviewed w/ patient  Today's medical care services serve as the continuing focal point for needed health care services that are part of ongoing care related to a patient's single, serious condition or a complex condition.   More than 50% of the encounter was spent counseling the patient on differential, workup, disease course and treatment/management. Education was provided to the patient during this encounter. All questions and concerns were addressed and answered. The patient verbalized understanding and agreed to the plan.   Patient has been instructed to call for an appointment if new symptoms develop. Patient has been instructed to make a follow-up appointment in 04/2024   Time spent on the encounter included, but is not limited to, preparing to see the patient, obtaining and/or reviewing separately obtained history, performing the evaluation, counseling and educating, independently interpreting results with communication to patient, order placement, referring and/or communicating with other health professionals as described, and documenting clinical information in the electronic health record.   This patient was evaluated by Dr. Alirio DNP in collaboration with Dr. Catalina MD

## 2024-01-20 NOTE — PHYSICAL EXAM
[] : no respiratory distress [Auscultation Breath Sounds / Voice Sounds] : lungs were clear to auscultation bilaterally [Heart Rate And Rhythm] : heart rate was normal and rhythm regular [Heart Sounds] : normal S1 and S2 [Heart Sounds Gallop] : no gallops [Murmurs] : no murmurs [Heart Sounds Pericardial Friction Rub] : no pericardial rub [No CVA Tenderness] : no ~M costovertebral angle tenderness [No Spinal Tenderness] : no spinal tenderness [Skin Color & Pigmentation] : normal skin color and pigmentation [Oriented To Time, Place, And Person] : oriented to person, place, and time [Impaired Insight] : insight and judgment were intact [FreeTextEntry1] : Small flaking patch scalp <1%, otherwise skin is clear

## 2024-01-22 LAB
ALBUMIN SERPL ELPH-MCNC: 4.8 G/DL
ALP BLD-CCNC: 72 U/L
ALT SERPL-CCNC: 26 U/L
ANION GAP SERPL CALC-SCNC: 19 MMOL/L
AST SERPL-CCNC: 22 U/L
BILIRUB SERPL-MCNC: 0.2 MG/DL
BUN SERPL-MCNC: 15 MG/DL
CALCIUM SERPL-MCNC: 9.3 MG/DL
CHLORIDE SERPL-SCNC: 99 MMOL/L
CO2 SERPL-SCNC: 20 MMOL/L
CREAT SERPL-MCNC: 0.98 MG/DL
CRP SERPL-MCNC: 10 MG/L
EGFR: 72 ML/MIN/1.73M2
POTASSIUM SERPL-SCNC: 4.7 MMOL/L
PROT SERPL-MCNC: 6.9 G/DL
SODIUM SERPL-SCNC: 138 MMOL/L

## 2024-01-24 ENCOUNTER — TRANSCRIPTION ENCOUNTER (OUTPATIENT)
Age: 49
End: 2024-01-24

## 2024-01-27 DIAGNOSIS — M54.9 DORSALGIA, UNSPECIFIED: ICD-10-CM

## 2024-03-07 ENCOUNTER — APPOINTMENT (OUTPATIENT)
Dept: ORTHOPEDIC SURGERY | Facility: CLINIC | Age: 49
End: 2024-03-07
Payer: COMMERCIAL

## 2024-03-07 VITALS — WEIGHT: 215 LBS | BODY MASS INDEX: 35.82 KG/M2 | HEIGHT: 65 IN

## 2024-03-07 DIAGNOSIS — M76.62 ACHILLES TENDINITIS, LEFT LEG: ICD-10-CM

## 2024-03-07 PROCEDURE — 99213 OFFICE O/P EST LOW 20 MIN: CPT

## 2024-03-07 NOTE — PHYSICAL EXAM
[Left] : left foot and ankle [Mild] : mild swelling over achilles tendon [NL (40)] : plantar flexion 40 degrees [5___] : inversion 5[unfilled]/5 [2+] : dorsalis pedis pulse: 2+ [] : patient ambulates without assistive device

## 2024-04-19 ENCOUNTER — APPOINTMENT (OUTPATIENT)
Dept: RHEUMATOLOGY | Facility: CLINIC | Age: 49
End: 2024-04-19
Payer: COMMERCIAL

## 2024-04-19 VITALS
BODY MASS INDEX: 34.16 KG/M2 | HEART RATE: 78 BPM | SYSTOLIC BLOOD PRESSURE: 118 MMHG | OXYGEN SATURATION: 99 % | HEIGHT: 65 IN | DIASTOLIC BLOOD PRESSURE: 79 MMHG | WEIGHT: 205 LBS

## 2024-04-19 DIAGNOSIS — M25.562 PAIN IN RIGHT KNEE: ICD-10-CM

## 2024-04-19 DIAGNOSIS — Z51.81 ENCOUNTER FOR THERAPEUTIC DRUG LVL MONITORING: ICD-10-CM

## 2024-04-19 DIAGNOSIS — L40.50 ARTHROPATHIC PSORIASIS, UNSPECIFIED: ICD-10-CM

## 2024-04-19 DIAGNOSIS — Z79.899 OTHER LONG TERM (CURRENT) DRUG THERAPY: ICD-10-CM

## 2024-04-19 DIAGNOSIS — M25.561 PAIN IN RIGHT KNEE: ICD-10-CM

## 2024-04-19 DIAGNOSIS — L40.9 PSORIASIS, UNSPECIFIED: ICD-10-CM

## 2024-04-19 PROCEDURE — 99214 OFFICE O/P EST MOD 30 MIN: CPT

## 2024-04-19 PROCEDURE — G2211 COMPLEX E/M VISIT ADD ON: CPT

## 2024-04-19 RX ORDER — TOPIRAMATE 50 MG/1
50 TABLET, COATED ORAL TWICE DAILY
Refills: 0 | Status: ACTIVE | COMMUNITY

## 2024-04-20 PROBLEM — M25.561 BILATERAL KNEE PAIN: Status: ACTIVE | Noted: 2024-04-20

## 2024-04-20 NOTE — ASSESSMENT
[FreeTextEntry1] : Ms. Zarate has a PMHX psoriasis, PsA, graves, htn, anxiety here acutely for routine follow-up  # knee pain  doubt related to PsA as this sounds more non-inflammatory -- x-ray of the bilateral knees -- PT script for the knees - obtain medical records from the patients neurologist ([facility] - quadriceps exercise that was taught today in the office  multiple issues addressed today  acute and chronic  #PsA HLA B27 negative, failed multiple other DMARD including skyrizi, Otezla, cosentyx, Humira, Simponi.  Now on Stelara.  Today reports near complete resolution of skin rash, limited patch to scalp <1%, however does c/o increase joint pain b/l hands and knees. No active synovitis on exam, but ttt. Unclear if arthralgias r/t inadequate PsA control --Will complete f/u labs --PT b/l knees ordered --Continue Stelara  #Lumbar Radiculopathy- +straight leg test on exam, however difficult to differentiate from possible limitation r/t R GTB. 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 Patient has not had additional follow-up with orthopedics or PT. --Follow-up with orthopedics for evaluation and repeat imaging --Rx Flexeril 10mg take 1/2 tab to full tab at bedtime PRN --Ordered PT for back - obtain medical records from the patients neurologist ([facility]. [facility]  #R GTB- Tender on exam and w/ movement. Patient not interested in steroid injection at this time. Amendable to other non-pharm therapies. Will need to resolve R GTB to appropriately assess LS. --Sleep left sided --Ice multiple times throughout the day --Ordered PT for GTB --Topical Icy Hot w/ lidocaine -- t/c injection if above conservative measures fail - declined today  #HTN BP elevated in-office initial 135/101-->136/91 asymptomatic followed by PCP closely f/u appt 01/22/24. Unable to rx PO steroid given potential SE -- much imrpoved today  Today's medical care services serve as the continuing focal point for needed health care services that are part of ongoing care related to a patient's single, serious condition or a complex condition.   More than 50% of the encounter was spent counseling the patient on differential, workup, disease course and treatment/management. Education was provided to the patient during this encounter. All questions and concerns were addressed and answered. The patient verbalized understanding and agreed to the plan.   Patient has been instructed to call for an appointment if new symptoms develop. Patient has been instructed to make a follow-up appointment in 04/2024   Time spent on the encounter included, but is not limited to, preparing to see the patient, obtaining and/or reviewing separately obtained history, performing the evaluation, counseling and educating, independently interpreting results with communication to patient, order placement, referring and/or communicating with other health professionals as described, and documenting clinical information in the electronic health record.   This patient was evaluated by Dr. Alirio DNP in collaboration with Dr. Catalina MD

## 2024-04-20 NOTE — ADDENDUM
[FreeTextEntry1] : This note was written by Dolly Terry, acting as the  for Dr. Mendez. This note accurately reflects the work and decisions made by Dr. Mendez.

## 2024-04-20 NOTE — PHYSICAL EXAM
[General Appearance - Alert] : alert [General Appearance - In No Acute Distress] : in no acute distress [] : no respiratory distress [Auscultation Breath Sounds / Voice Sounds] : lungs were clear to auscultation bilaterally [Skin Color & Pigmentation] : normal skin color and pigmentation [Oriented To Time, Place, And Person] : oriented to person, place, and time [Impaired Insight] : insight and judgment were intact [FreeTextEntry1] : R knee rROM decreased 10 degrees; POM b/l hip fROM, R GTB ttt w/ RLE 4/5

## 2024-04-20 NOTE — HISTORY OF PRESENT ILLNESS
[___ Month(s) Ago] : [unfilled] month(s) ago [FreeTextEntry1] : Ms. Zarate has a PMHX psoriasis, PsA, graves, htn, anxiety here acutely for routine follow-up  INTERVAL HISTORY - the patient states that the physical therapy has helped significantly with the pain in her back - states she has an itchy spot in her scalp that has been chronic and does not go away with shampoo and oils for itch relief - swelling in the bilateral elbows - pain when going up and down the stairs - unable to open a jar would a lid gripper for addition assistance - has a rotator cuff tear in the patient's right shoulder  -----------------------------------------------------  Background / presentation PsA. HLAB27 neg - skyrizi ([id number]) - effective only for skin, - otezla (diarrhea), - ? cosentyx in the past - Humira (2021 - 2023). very effective with reduction in pain and swelling - but secondary failure after covid infection - Simponi (2023 - 2023) - Stelara (2023 - present)  CARE TEAM Dr. Becki Dorantes (Male Neurologist - [facility]) [facility] [place], [place], [address] [phone]

## 2024-04-22 LAB
ALBUMIN SERPL ELPH-MCNC: 4.2 G/DL
ALP BLD-CCNC: 59 U/L
ALT SERPL-CCNC: 42 U/L
ANION GAP SERPL CALC-SCNC: 18 MMOL/L
AST SERPL-CCNC: 29 U/L
BILIRUB SERPL-MCNC: 0.2 MG/DL
BUN SERPL-MCNC: 12 MG/DL
CALCIUM SERPL-MCNC: 9.4 MG/DL
CHLORIDE SERPL-SCNC: 108 MMOL/L
CO2 SERPL-SCNC: 17 MMOL/L
CREAT SERPL-MCNC: 0.79 MG/DL
CRP SERPL-MCNC: 5 MG/L
EGFR: 93 ML/MIN/1.73M2
ERYTHROCYTE [SEDIMENTATION RATE] IN BLOOD BY WESTERGREN METHOD: 14 MM/HR
HCT VFR BLD CALC: 38.6 %
HGB BLD-MCNC: 11.8 G/DL
MCHC RBC-ENTMCNC: 26.7 PG
MCHC RBC-ENTMCNC: 30.6 GM/DL
MCV RBC AUTO: 87.3 FL
PLATELET # BLD AUTO: 323 K/UL
POTASSIUM SERPL-SCNC: 5 MMOL/L
PROT SERPL-MCNC: 6.4 G/DL
RBC # BLD: 4.42 M/UL
RBC # FLD: 14.6 %
SODIUM SERPL-SCNC: 143 MMOL/L
WBC # FLD AUTO: 9.3 K/UL

## 2024-04-30 ENCOUNTER — NON-APPOINTMENT (OUTPATIENT)
Age: 49
End: 2024-04-30

## 2024-08-05 ENCOUNTER — RX RENEWAL (OUTPATIENT)
Age: 49
End: 2024-08-05

## 2024-08-09 ENCOUNTER — APPOINTMENT (OUTPATIENT)
Dept: RHEUMATOLOGY | Facility: CLINIC | Age: 49
End: 2024-08-09

## 2024-08-09 ENCOUNTER — LABORATORY RESULT (OUTPATIENT)
Age: 49
End: 2024-08-09

## 2024-08-09 PROBLEM — M75.21 BICEPS TENDINITIS OF RIGHT UPPER EXTREMITY: Status: RESOLVED | Noted: 2022-10-06 | Resolved: 2024-08-09

## 2024-08-09 PROBLEM — Z87.39 HISTORY OF BACK PAIN: Status: RESOLVED | Noted: 2021-06-18 | Resolved: 2024-08-09

## 2024-08-09 PROBLEM — M25.561 BILATERAL KNEE PAIN: Status: RESOLVED | Noted: 2024-04-20 | Resolved: 2024-08-09

## 2024-08-09 PROBLEM — M75.51 BURSITIS OF RIGHT SHOULDER: Status: RESOLVED | Noted: 2022-10-06 | Resolved: 2024-08-09

## 2024-08-09 PROBLEM — M76.62 ACHILLES TENDINITIS OF LEFT LOWER EXTREMITY: Status: RESOLVED | Noted: 2023-09-21 | Resolved: 2024-08-09

## 2024-08-09 PROCEDURE — 99214 OFFICE O/P EST MOD 30 MIN: CPT

## 2024-08-09 PROCEDURE — G2211 COMPLEX E/M VISIT ADD ON: CPT

## 2024-08-09 NOTE — HISTORY OF PRESENT ILLNESS
[___ Month(s) Ago] : [unfilled] month(s) ago [FreeTextEntry1] : Ms. Zarate has a PMHX psoriasis, PsA, graves, htn, anxiety here acutely for routine follow-up  INTERVAL HISTORY  in terms of joints and pain  - doing great - no pain, swelling or stiffness   in terms of skin  - scalp pso is active  - uses the shampoo   in terms of other issues  - has had 23 pounds weight loss  - feels good in some wieghts  - attributes this to thyroid, and new migraine meds  - more concisous of eating patterns   in terms of medication  - adherent and toelrant    -----------------------------------------------------  Background / presentation PsA. HLAB27 neg - skyrizi ([id number]) - effective only for skin, - otezla (diarrhea), - ? cosentyx in the past - Humira (2021 - 2023). very effective with reduction in pain and swelling - but secondary failure after covid infection - Simponi (2023 - 2023) - Stelara (2023 - present)  CARE TEAM Dr. Becki Dorantes (Male Neurologist - [facility]) [address] [place], [place], [address] [phone]

## 2024-08-09 NOTE — ASSESSMENT
[FreeTextEntry1] : Ms. Zarate has a PMHX psoriasis, PsA, graves, htn, anxiety here acutely for routine follow-up  # knee pain  doubt related to PsA as this sounds more non-inflammatory -- improved  #PsA HLA B27 negative, failed multiple other DMARD including skyrizi, Otezla, cosentyx, Humira, Simponi.  Now on Stelara.  Today reports resolution of joint issues - but scalp is active  -- althought PsO is active - wants to contineu current regimen for now  -- shampoo per derm  --Will complete f/u labs --PT b/l knees ordered --Continue Stelara  #Lumbar Radiculopathy- +straight leg test on exam, however difficult to differentiate from possible limitation r/t R GTB. 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 Patient has not had additional follow-up with orthopedics or PT. --Follow-up with orthopedics for evaluation and repeat imaging - resolved  #R GTB- Tender on exam and w/ movement. Patient not interested in steroid injection at this time. Amendable to other non-pharm therapies. Will need to resolve R GTB to appropriately assess LS. -- resolved  # TSH -- has had up and dwon on virgen hernandez  -- check today  -- will also send to PCP  # unintentional weight loss  -- observe closely for stabilization -- possbily 2/2 new migraine meds- suppressed appetiti, stress, thyroid issues are active (was low but hten incresaed the dose and then too high) -- f/u PCP -- discussed   Today's medical care services serve as the continuing focal point for needed health care services that are part of ongoing care related to a patient's single, serious condition or a complex condition.   More than 50% of the encounter was spent counseling the patient on differential, workup, disease course and treatment/management. Education was provided to the patient during this encounter. All questions and concerns were addressed and answered. The patient verbalized understanding and agreed to the plan.   Patient has been instructed to call for an appointment if new symptoms develop. Patient has been instructed to make a follow-up appointment in 04/2024   Time spent on the encounter included, but is not limited to, preparing to see the patient, obtaining and/or reviewing separately obtained history, performing the evaluation, counseling and educating, independently interpreting results with communication to patient, order placement, referring and/or communicating with other health professionals as described, and documenting clinical information in the electronic health record.   This patient was evaluated by Dr. Alirio DNP in collaboration with Dr. Catalina MD

## 2024-08-16 ENCOUNTER — RX RENEWAL (OUTPATIENT)
Age: 49
End: 2024-08-16

## 2024-10-29 NOTE — ASSESSMENT
[FreeTextEntry1] : 44 yo woman PMHX psoriasis, htn, graves, gerd, preeclampsia here with polyarthralgia\par \par multple chronic and now two new issues\par \par # PsA.  \par HLAB27 neg.  Previous treatment included skyrizi ([id number]) - effective only for skin, otezla (diarrhea), ? cosentyx vs stelara in the past.  started humira (2021 - present).  \par -- check inflammatory markers\par -- continue humira\par \par #toe pain \par new toe pain - was told it was gout.  discussed with patient at length.  risk factors was use of diuretic only.   psoriatic burden doesn’t seem high enough to be independent rf.   quality and onset of pain however is c/w crystalline issue.  could also be pseudogout. \par -- check sUA \par -- avoiding diuretics for now \par -- dietary purine avoidance - discussed purine rich foods\par -- no indication for gout treatmetn at this time\par \par #migraines \par possibly related to hypertension ? related to nsaid use - was using two prior to this \par -- d/w patient NSAID - patient aware \par -- working with neuro as well\par \par #htn \par todays was good at 121/64.   \par -- avoid diuretic -- monitor closely on nsaid\par # hypothryoidism \par currently being regulated \par -- check TSH and T4\par \par #low vit d\par - supplement increased  and will \par \par \par #medication monitoring \par -- quant tb negative april 2021\par -- now on NSAID and +h/o gerd take with food and  on omeprazole\par -- check labs\par \par #vaccine counseling\par s/p vaccine x 2 shots.   applied for booster\par -- Patient advised that vaccine effectiveness may be blunted by some rheum meds.\par \par More than 50% of the encounter was spent counseling the patient on differential, workup, disease course and treatment/management.  Education was provided to the patient during this encounter.  All questions and concerns were addressed and answered.   The patient verbalized understanding and agreed to the plan. \par \par Patient has been instructed to call for an appointment if new symptoms develop.\par Patient has been instructed to make a followup appointment in 3 months.\par \par \par  no

## 2024-11-12 ENCOUNTER — APPOINTMENT (OUTPATIENT)
Dept: RHEUMATOLOGY | Facility: CLINIC | Age: 49
End: 2024-11-12
Payer: COMMERCIAL

## 2024-11-12 VITALS
HEART RATE: 80 BPM | OXYGEN SATURATION: 99 % | WEIGHT: 196 LBS | DIASTOLIC BLOOD PRESSURE: 84 MMHG | HEIGHT: 65 IN | SYSTOLIC BLOOD PRESSURE: 119 MMHG | BODY MASS INDEX: 32.65 KG/M2

## 2024-11-12 DIAGNOSIS — L40.50 ARTHROPATHIC PSORIASIS, UNSPECIFIED: ICD-10-CM

## 2024-11-12 DIAGNOSIS — Z79.899 OTHER LONG TERM (CURRENT) DRUG THERAPY: ICD-10-CM

## 2024-11-12 DIAGNOSIS — L40.9 PSORIASIS, UNSPECIFIED: ICD-10-CM

## 2024-11-12 DIAGNOSIS — Z51.81 ENCOUNTER FOR THERAPEUTIC DRUG LVL MONITORING: ICD-10-CM

## 2024-11-12 PROCEDURE — 99214 OFFICE O/P EST MOD 30 MIN: CPT

## 2024-11-12 PROCEDURE — G2211 COMPLEX E/M VISIT ADD ON: CPT | Mod: NC

## 2024-11-12 RX ORDER — METHOTREXATE 2.5 MG/1
2.5 TABLET ORAL
Qty: 36 | Refills: 0 | Status: ACTIVE | COMMUNITY
Start: 2024-11-12 | End: 1900-01-01

## 2024-11-12 RX ORDER — FOLIC ACID 1 MG/1
1 TABLET ORAL
Qty: 30 | Refills: 5 | Status: ACTIVE | COMMUNITY
Start: 2024-11-12 | End: 1900-01-01

## 2024-11-13 LAB
ALBUMIN SERPL ELPH-MCNC: 4.4 G/DL
ALP BLD-CCNC: 63 U/L
ALT SERPL-CCNC: 18 U/L
ANION GAP SERPL CALC-SCNC: 11 MMOL/L
AST SERPL-CCNC: 14 U/L
BILIRUB SERPL-MCNC: 0.4 MG/DL
BUN SERPL-MCNC: 14 MG/DL
CALCIUM SERPL-MCNC: 9.1 MG/DL
CHLORIDE SERPL-SCNC: 108 MMOL/L
CO2 SERPL-SCNC: 24 MMOL/L
CREAT SERPL-MCNC: 0.82 MG/DL
CRP SERPL-MCNC: <3 MG/L
EGFR: 89 ML/MIN/1.73M2
ERYTHROCYTE [SEDIMENTATION RATE] IN BLOOD BY WESTERGREN METHOD: 5 MM/HR
HCT VFR BLD CALC: 43.1 %
HGB BLD-MCNC: 12.9 G/DL
MCHC RBC-ENTMCNC: 27.2 PG
MCHC RBC-ENTMCNC: 29.9 G/DL
MCV RBC AUTO: 90.7 FL
PLATELET # BLD AUTO: 396 K/UL
POTASSIUM SERPL-SCNC: 4.6 MMOL/L
PROT SERPL-MCNC: 6.6 G/DL
RBC # BLD: 4.75 M/UL
RBC # FLD: 14.1 %
SODIUM SERPL-SCNC: 142 MMOL/L
WBC # FLD AUTO: 7.55 K/UL

## 2024-11-19 ENCOUNTER — TRANSCRIPTION ENCOUNTER (OUTPATIENT)
Age: 49
End: 2024-11-19

## 2024-11-20 ENCOUNTER — APPOINTMENT (OUTPATIENT)
Dept: CARDIOLOGY | Facility: CLINIC | Age: 49
End: 2024-11-20
Payer: COMMERCIAL

## 2024-11-20 VITALS
OXYGEN SATURATION: 99 % | BODY MASS INDEX: 31.82 KG/M2 | RESPIRATION RATE: 17 BRPM | WEIGHT: 191 LBS | TEMPERATURE: 98 F | DIASTOLIC BLOOD PRESSURE: 68 MMHG | SYSTOLIC BLOOD PRESSURE: 100 MMHG | HEIGHT: 65 IN | HEART RATE: 63 BPM

## 2024-11-20 DIAGNOSIS — I10 ESSENTIAL (PRIMARY) HYPERTENSION: ICD-10-CM

## 2024-11-20 DIAGNOSIS — G90.A POSTURAL ORTHOSTATIC TACHYCARDIA SYNDROME [POTS]: ICD-10-CM

## 2024-11-20 DIAGNOSIS — R06.09 OTHER FORMS OF DYSPNEA: ICD-10-CM

## 2024-11-20 DIAGNOSIS — Z01.810 ENCOUNTER FOR PREPROCEDURAL CARDIOVASCULAR EXAMINATION: ICD-10-CM

## 2024-11-20 PROCEDURE — G2211 COMPLEX E/M VISIT ADD ON: CPT | Mod: NC

## 2024-11-20 PROCEDURE — 99214 OFFICE O/P EST MOD 30 MIN: CPT

## 2024-11-20 RX ORDER — OMEPRAZOLE 20 MG/1
20 CAPSULE, DELAYED RELEASE ORAL
Refills: 0 | Status: ACTIVE | COMMUNITY

## 2024-11-20 RX ORDER — SAW/PYGEUM/BETA/HERB/D3/B6/ZN 30 MG-25MG
CAPSULE ORAL
Refills: 0 | Status: ACTIVE | COMMUNITY

## 2024-11-21 ENCOUNTER — TRANSCRIPTION ENCOUNTER (OUTPATIENT)
Age: 49
End: 2024-11-21

## 2024-12-17 ENCOUNTER — APPOINTMENT (OUTPATIENT)
Dept: RHEUMATOLOGY | Facility: CLINIC | Age: 49
End: 2024-12-17
Payer: COMMERCIAL

## 2024-12-17 VITALS
WEIGHT: 192 LBS | HEART RATE: 70 BPM | DIASTOLIC BLOOD PRESSURE: 81 MMHG | SYSTOLIC BLOOD PRESSURE: 119 MMHG | OXYGEN SATURATION: 98 % | HEIGHT: 65 IN | BODY MASS INDEX: 31.99 KG/M2

## 2024-12-17 DIAGNOSIS — M79.661 PAIN IN RIGHT LOWER LEG: ICD-10-CM

## 2024-12-17 DIAGNOSIS — Z79.899 OTHER LONG TERM (CURRENT) DRUG THERAPY: ICD-10-CM

## 2024-12-17 DIAGNOSIS — L40.9 PSORIASIS, UNSPECIFIED: ICD-10-CM

## 2024-12-17 DIAGNOSIS — M79.662 PAIN IN RIGHT LOWER LEG: ICD-10-CM

## 2024-12-17 DIAGNOSIS — L40.50 ARTHROPATHIC PSORIASIS, UNSPECIFIED: ICD-10-CM

## 2024-12-17 DIAGNOSIS — M79.669 PAIN IN UNSPECIFIED LOWER LEG: ICD-10-CM

## 2024-12-17 DIAGNOSIS — K58.9 IRRITABLE BOWEL SYNDROME, UNSPECIFIED: ICD-10-CM

## 2024-12-17 DIAGNOSIS — Z01.810 ENCOUNTER FOR PREPROCEDURAL CARDIOVASCULAR EXAMINATION: ICD-10-CM

## 2024-12-17 DIAGNOSIS — Z51.81 ENCOUNTER FOR THERAPEUTIC DRUG LVL MONITORING: ICD-10-CM

## 2024-12-17 DIAGNOSIS — R19.7 DIARRHEA, UNSPECIFIED: ICD-10-CM

## 2024-12-17 PROCEDURE — G2211 COMPLEX E/M VISIT ADD ON: CPT | Mod: NC

## 2024-12-17 PROCEDURE — 99215 OFFICE O/P EST HI 40 MIN: CPT

## 2024-12-19 PROBLEM — Z01.810 PREOPERATIVE CARDIOVASCULAR EXAMINATION: Status: RESOLVED | Noted: 2024-11-20 | Resolved: 2024-12-19

## 2024-12-19 PROBLEM — Z79.899 NEW MEDICATION ADDED: Status: RESOLVED | Noted: 2024-11-12 | Resolved: 2024-12-19

## 2024-12-19 PROBLEM — Z79.899 HIGH RISK MEDICATION USE: Status: ACTIVE | Noted: 2024-12-19

## 2024-12-19 LAB
ALBUMIN SERPL ELPH-MCNC: 4 G/DL
ALP BLD-CCNC: 68 U/L
ALT SERPL-CCNC: 11 U/L
ANION GAP SERPL CALC-SCNC: 10 MMOL/L
AST SERPL-CCNC: 12 U/L
BILIRUB SERPL-MCNC: <0.2 MG/DL
BUN SERPL-MCNC: 13 MG/DL
CALCIUM SERPL-MCNC: 8.8 MG/DL
CHLORIDE SERPL-SCNC: 108 MMOL/L
CO2 SERPL-SCNC: 22 MMOL/L
CREAT SERPL-MCNC: 0.84 MG/DL
CRP SERPL-MCNC: <3 MG/L
EGFR: 87 ML/MIN/1.73M2
ERYTHROCYTE [SEDIMENTATION RATE] IN BLOOD BY WESTERGREN METHOD: 6 MM/HR
HCT VFR BLD CALC: 38.4 %
HGB BLD-MCNC: 11.6 G/DL
MCHC RBC-ENTMCNC: 27.4 PG
MCHC RBC-ENTMCNC: 30.2 G/DL
MCV RBC AUTO: 90.8 FL
PLATELET # BLD AUTO: 340 K/UL
POTASSIUM SERPL-SCNC: 4.6 MMOL/L
PROT SERPL-MCNC: 6 G/DL
RBC # BLD: 4.23 M/UL
RBC # FLD: 14.8 %
SODIUM SERPL-SCNC: 140 MMOL/L
WBC # FLD AUTO: 6.4 K/UL

## 2024-12-20 LAB — MAGNESIUM SERPL-MCNC: 2.3 MG/DL

## 2025-01-19 ENCOUNTER — NON-APPOINTMENT (OUTPATIENT)
Age: 50
End: 2025-01-19

## 2025-01-21 ENCOUNTER — TRANSCRIPTION ENCOUNTER (OUTPATIENT)
Age: 50
End: 2025-01-21

## 2025-01-24 ENCOUNTER — APPOINTMENT (OUTPATIENT)
Dept: RHEUMATOLOGY | Facility: CLINIC | Age: 50
End: 2025-01-24
Payer: COMMERCIAL

## 2025-01-24 VITALS
HEART RATE: 72 BPM | SYSTOLIC BLOOD PRESSURE: 112 MMHG | OXYGEN SATURATION: 99 % | DIASTOLIC BLOOD PRESSURE: 77 MMHG | WEIGHT: 189 LBS | BODY MASS INDEX: 31.49 KG/M2 | HEIGHT: 65 IN

## 2025-01-24 DIAGNOSIS — R19.7 DIARRHEA, UNSPECIFIED: ICD-10-CM

## 2025-01-24 DIAGNOSIS — Z51.81 ENCOUNTER FOR THERAPEUTIC DRUG LVL MONITORING: ICD-10-CM

## 2025-01-24 DIAGNOSIS — Z79.899 OTHER LONG TERM (CURRENT) DRUG THERAPY: ICD-10-CM

## 2025-01-24 DIAGNOSIS — Z79.60 LONG TERM (CURRENT) USE OF UNSPECIFIED IMMUNOMODULATORS AND IMMUNOSUPPRESSANTS: ICD-10-CM

## 2025-01-24 DIAGNOSIS — L40.9 PSORIASIS, UNSPECIFIED: ICD-10-CM

## 2025-01-24 DIAGNOSIS — K58.9 IRRITABLE BOWEL SYNDROME, UNSPECIFIED: ICD-10-CM

## 2025-01-24 DIAGNOSIS — U07.1 COVID-19: ICD-10-CM

## 2025-01-24 DIAGNOSIS — L40.50 ARTHROPATHIC PSORIASIS, UNSPECIFIED: ICD-10-CM

## 2025-01-24 PROCEDURE — 99214 OFFICE O/P EST MOD 30 MIN: CPT

## 2025-01-24 PROCEDURE — G2211 COMPLEX E/M VISIT ADD ON: CPT | Mod: NC

## 2025-01-27 PROBLEM — U07.1 COVID-19 VIRUS INFECTION: Status: ACTIVE | Noted: 2025-01-27

## 2025-01-27 LAB
ALBUMIN SERPL ELPH-MCNC: 4.2 G/DL
ALP BLD-CCNC: 61 U/L
ALT SERPL-CCNC: 19 U/L
ANION GAP SERPL CALC-SCNC: 12 MMOL/L
AST SERPL-CCNC: 19 U/L
BILIRUB SERPL-MCNC: 0.3 MG/DL
BUN SERPL-MCNC: 12 MG/DL
CALCIUM SERPL-MCNC: 9 MG/DL
CHLORIDE SERPL-SCNC: 105 MMOL/L
CO2 SERPL-SCNC: 24 MMOL/L
CREAT SERPL-MCNC: 0.93 MG/DL
CRP SERPL-MCNC: 3 MG/L
EGFR: 76 ML/MIN/1.73M2
ERYTHROCYTE [SEDIMENTATION RATE] IN BLOOD BY WESTERGREN METHOD: 5 MM/HR
HCT VFR BLD CALC: 41.3 %
HGB BLD-MCNC: 12.9 G/DL
MCHC RBC-ENTMCNC: 28.4 PG
MCHC RBC-ENTMCNC: 31.2 G/DL
MCV RBC AUTO: 91 FL
PLATELET # BLD AUTO: 378 K/UL
POTASSIUM SERPL-SCNC: 4.7 MMOL/L
PROT SERPL-MCNC: 6.4 G/DL
RBC # BLD: 4.54 M/UL
RBC # FLD: 15.6 %
SODIUM SERPL-SCNC: 140 MMOL/L
WBC # FLD AUTO: 9.73 K/UL

## 2025-02-14 ENCOUNTER — APPOINTMENT (OUTPATIENT)
Dept: ORTHOPEDIC SURGERY | Facility: CLINIC | Age: 50
End: 2025-02-14
Payer: COMMERCIAL

## 2025-02-14 VITALS — HEIGHT: 65 IN | WEIGHT: 189 LBS | BODY MASS INDEX: 31.49 KG/M2

## 2025-02-14 DIAGNOSIS — M75.41 IMPINGEMENT SYNDROME OF RIGHT SHOULDER: ICD-10-CM

## 2025-02-14 DIAGNOSIS — M75.111 INCOMPLETE ROTATOR CUFF TEAR OR RUPTURE OF RIGHT SHOULDER, NOT SPECIFIED AS TRAUMATIC: ICD-10-CM

## 2025-02-14 PROCEDURE — 99214 OFFICE O/P EST MOD 30 MIN: CPT

## 2025-02-14 PROCEDURE — 73010 X-RAY EXAM OF SHOULDER BLADE: CPT | Mod: RT

## 2025-02-14 PROCEDURE — 73030 X-RAY EXAM OF SHOULDER: CPT | Mod: RT

## 2025-02-14 RX ORDER — METHYLPREDNISOLONE 4 MG/1
4 TABLET ORAL
Qty: 1 | Refills: 0 | Status: ACTIVE | COMMUNITY
Start: 2025-02-14 | End: 1900-01-01

## 2025-03-25 ENCOUNTER — APPOINTMENT (OUTPATIENT)
Dept: MRI IMAGING | Facility: CLINIC | Age: 50
End: 2025-03-25
Payer: COMMERCIAL

## 2025-03-25 ENCOUNTER — APPOINTMENT (OUTPATIENT)
Dept: ORTHOPEDIC SURGERY | Facility: CLINIC | Age: 50
End: 2025-03-25
Payer: COMMERCIAL

## 2025-03-25 VITALS — BODY MASS INDEX: 31.49 KG/M2 | WEIGHT: 189 LBS | HEIGHT: 65 IN

## 2025-03-25 DIAGNOSIS — M75.111 INCOMPLETE ROTATOR CUFF TEAR OR RUPTURE OF RIGHT SHOULDER, NOT SPECIFIED AS TRAUMATIC: ICD-10-CM

## 2025-03-25 PROCEDURE — 99214 OFFICE O/P EST MOD 30 MIN: CPT

## 2025-03-25 PROCEDURE — 73221 MRI JOINT UPR EXTREM W/O DYE: CPT | Mod: RT

## 2025-03-25 RX ORDER — NAPROXEN 500 MG/1
500 TABLET ORAL TWICE DAILY
Qty: 60 | Refills: 1 | Status: ACTIVE | COMMUNITY
Start: 2025-03-25 | End: 1900-01-01

## 2025-03-28 ENCOUNTER — APPOINTMENT (OUTPATIENT)
Dept: ORTHOPEDIC SURGERY | Facility: CLINIC | Age: 50
End: 2025-03-28
Payer: COMMERCIAL

## 2025-03-28 DIAGNOSIS — M19.011 PRIMARY OSTEOARTHRITIS, RIGHT SHOULDER: ICD-10-CM

## 2025-03-28 DIAGNOSIS — M75.41 IMPINGEMENT SYNDROME OF RIGHT SHOULDER: ICD-10-CM

## 2025-03-28 PROCEDURE — 20611 DRAIN/INJ JOINT/BURSA W/US: CPT | Mod: RT

## 2025-03-28 PROCEDURE — 99213 OFFICE O/P EST LOW 20 MIN: CPT | Mod: 25

## 2025-05-02 ENCOUNTER — APPOINTMENT (OUTPATIENT)
Dept: RHEUMATOLOGY | Facility: CLINIC | Age: 50
End: 2025-05-02

## 2025-06-10 ENCOUNTER — TRANSCRIPTION ENCOUNTER (OUTPATIENT)
Age: 50
End: 2025-06-10

## 2025-06-17 ENCOUNTER — APPOINTMENT (OUTPATIENT)
Dept: RHEUMATOLOGY | Facility: CLINIC | Age: 50
End: 2025-06-17
Payer: COMMERCIAL

## 2025-06-17 VITALS
OXYGEN SATURATION: 99 % | BODY MASS INDEX: 28.82 KG/M2 | HEART RATE: 78 BPM | DIASTOLIC BLOOD PRESSURE: 77 MMHG | SYSTOLIC BLOOD PRESSURE: 111 MMHG | WEIGHT: 173 LBS | HEIGHT: 65 IN

## 2025-06-17 PROCEDURE — 99214 OFFICE O/P EST MOD 30 MIN: CPT | Mod: GC

## 2025-06-17 PROCEDURE — G2211 COMPLEX E/M VISIT ADD ON: CPT | Mod: NC,GC

## 2025-06-18 LAB
ALBUMIN SERPL ELPH-MCNC: 4.3 G/DL
ALP BLD-CCNC: 54 U/L
ALT SERPL-CCNC: 22 U/L
ANION GAP SERPL CALC-SCNC: 10 MMOL/L
AST SERPL-CCNC: 17 U/L
BILIRUB SERPL-MCNC: 0.5 MG/DL
BUN SERPL-MCNC: 14 MG/DL
CALCIUM SERPL-MCNC: 9.5 MG/DL
CHLORIDE SERPL-SCNC: 106 MMOL/L
CO2 SERPL-SCNC: 23 MMOL/L
CREAT SERPL-MCNC: 0.86 MG/DL
CRP SERPL-MCNC: 3 MG/L
EGFRCR SERPLBLD CKD-EPI 2021: 84 ML/MIN/1.73M2
ERYTHROCYTE [SEDIMENTATION RATE] IN BLOOD BY WESTERGREN METHOD: 6 MM/HR
HCT VFR BLD CALC: 41.3 %
HGB BLD-MCNC: 12.8 G/DL
MCHC RBC-ENTMCNC: 29 PG
MCHC RBC-ENTMCNC: 31 G/DL
MCV RBC AUTO: 93.4 FL
PLATELET # BLD AUTO: 394 K/UL
POTASSIUM SERPL-SCNC: 4.9 MMOL/L
PROT SERPL-MCNC: 6.4 G/DL
RBC # BLD: 4.42 M/UL
RBC # FLD: 14.8 %
SODIUM SERPL-SCNC: 139 MMOL/L
WBC # FLD AUTO: 9.84 K/UL

## 2025-06-25 ENCOUNTER — RX RENEWAL (OUTPATIENT)
Age: 50
End: 2025-06-25

## 2025-07-16 ENCOUNTER — APPOINTMENT (OUTPATIENT)
Dept: CARDIOLOGY | Facility: CLINIC | Age: 50
End: 2025-07-16
Payer: COMMERCIAL

## 2025-07-16 ENCOUNTER — NON-APPOINTMENT (OUTPATIENT)
Age: 50
End: 2025-07-16

## 2025-07-16 VITALS
SYSTOLIC BLOOD PRESSURE: 111 MMHG | HEART RATE: 64 BPM | HEIGHT: 65 IN | OXYGEN SATURATION: 100 % | TEMPERATURE: 98.1 F | DIASTOLIC BLOOD PRESSURE: 77 MMHG | BODY MASS INDEX: 32.49 KG/M2 | WEIGHT: 195 LBS | RESPIRATION RATE: 16 BRPM

## 2025-07-16 PROBLEM — U09.9 POST-ACUTE SEQUELAE OF COVID-19 (PASC): Status: ACTIVE | Noted: 2022-07-01

## 2025-07-16 PROCEDURE — G2211 COMPLEX E/M VISIT ADD ON: CPT | Mod: NC

## 2025-07-16 PROCEDURE — 99214 OFFICE O/P EST MOD 30 MIN: CPT

## 2025-07-16 PROCEDURE — 93000 ELECTROCARDIOGRAM COMPLETE: CPT

## 2025-07-21 ENCOUNTER — TRANSCRIPTION ENCOUNTER (OUTPATIENT)
Age: 50
End: 2025-07-21

## 2025-07-21 ENCOUNTER — APPOINTMENT (OUTPATIENT)
Dept: ORTHOPEDIC SURGERY | Facility: CLINIC | Age: 50
End: 2025-07-21
Payer: COMMERCIAL

## 2025-07-21 VITALS — BODY MASS INDEX: 32.49 KG/M2 | HEIGHT: 65 IN | WEIGHT: 195 LBS

## 2025-07-21 DIAGNOSIS — S90.02XA CONTUSION OF LEFT ANKLE, INITIAL ENCOUNTER: ICD-10-CM

## 2025-07-21 PROCEDURE — 99213 OFFICE O/P EST LOW 20 MIN: CPT

## 2025-07-21 PROCEDURE — 73610 X-RAY EXAM OF ANKLE: CPT | Mod: LT

## 2025-07-21 RX ORDER — NALTREXONE HYDROCHLORIDE 50 MG/1
50 TABLET, FILM COATED ORAL
Qty: 90 | Refills: 1 | Status: ACTIVE | COMMUNITY
Start: 2025-07-16 | End: 1900-01-01

## 2025-08-29 ENCOUNTER — RX RENEWAL (OUTPATIENT)
Age: 50
End: 2025-08-29